# Patient Record
Sex: FEMALE | Race: WHITE | NOT HISPANIC OR LATINO | Employment: PART TIME | URBAN - METROPOLITAN AREA
[De-identification: names, ages, dates, MRNs, and addresses within clinical notes are randomized per-mention and may not be internally consistent; named-entity substitution may affect disease eponyms.]

---

## 2023-08-10 LAB
EXTERNAL HEPATITIS B SURFACE ANTIGEN: NEGATIVE
EXTERNAL HEPATITIS C AB: NORMAL
EXTERNAL RUBELLA QUALITATIVE: NORMAL
EXTERNAL SYPHILIS RPR SCREEN: NORMAL
HIV1 P24 AG SERPL QL IA: NORMAL
VZV IGG SER QL: <135 (ref 0–?)

## 2024-01-09 LAB
EXTERNAL GLUCOSE TOLERANCE TEST FASTING: 81 MG/DL
EXTERNAL GTT 1 HOUR: 194
EXTERNAL GTT 3 HOURS: 128

## 2024-02-06 ENCOUNTER — HOSPITAL ENCOUNTER (INPATIENT)
Facility: HOSPITAL | Age: 24
LOS: 6 days | Discharge: HOME | End: 2024-02-12
Attending: OBSTETRICS & GYNECOLOGY | Admitting: OBSTETRICS & GYNECOLOGY
Payer: MEDICAID

## 2024-02-06 LAB
ABO GROUP BLD: NORMAL
ABO GROUP BLD: NORMAL
ALP SERPL-CCNC: 164 U/L (ref 39–117)
ALT SERPL W P-5'-P-CCNC: 10 U/L (ref 1–33)
AMPHET+METHAMPHET UR QL: NEGATIVE
AMPHETAMINES UR QL: NEGATIVE
AST SERPL-CCNC: 18 U/L (ref 1–32)
BACTERIA UR QL AUTO: NORMAL /HPF
BARBITURATES UR QL SCN: NEGATIVE
BENZODIAZ UR QL SCN: NEGATIVE
BILIRUB SERPL-MCNC: 0.2 MG/DL (ref 0–1.2)
BILIRUB UR QL STRIP: NEGATIVE
BLD GP AB SCN SERPL QL: NEGATIVE
BUPRENORPHINE SERPL-MCNC: NEGATIVE NG/ML
CANNABINOIDS SERPL QL: NEGATIVE
CLARITY UR: CLEAR
COCAINE UR QL: NEGATIVE
COLOR UR: YELLOW
CREAT SERPL-MCNC: 0.44 MG/DL (ref 0.57–1)
DEPRECATED RDW RBC AUTO: 39.3 FL (ref 37–54)
ERYTHROCYTE [DISTWIDTH] IN BLOOD BY AUTOMATED COUNT: 13.2 % (ref 12.3–15.4)
FENTANYL UR-MCNC: NEGATIVE NG/ML
GLUCOSE UR STRIP-MCNC: NEGATIVE MG/DL
HCT VFR BLD AUTO: 38 % (ref 34–46.6)
HGB BLD-MCNC: 12.7 G/DL (ref 12–15.9)
HGB UR QL STRIP.AUTO: ABNORMAL
HYALINE CASTS UR QL AUTO: NORMAL /LPF
KETONES UR QL STRIP: ABNORMAL
LDH SERPL-CCNC: 129 U/L (ref 135–214)
LEUKOCYTE ESTERASE UR QL STRIP.AUTO: NEGATIVE
MCH RBC QN AUTO: 27.5 PG (ref 26.6–33)
MCHC RBC AUTO-ENTMCNC: 33.4 G/DL (ref 31.5–35.7)
MCV RBC AUTO: 82.4 FL (ref 79–97)
METHADONE UR QL SCN: NEGATIVE
NITRITE UR QL STRIP: NEGATIVE
OPIATES UR QL: NEGATIVE
OXYCODONE UR QL SCN: NEGATIVE
PCP UR QL SCN: NEGATIVE
PH UR STRIP.AUTO: 6 [PH] (ref 5–8)
PLATELET # BLD AUTO: 212 10*3/MM3 (ref 140–450)
PMV BLD AUTO: 11 FL (ref 6–12)
PROT UR QL STRIP: NEGATIVE
RBC # BLD AUTO: 4.61 10*6/MM3 (ref 3.77–5.28)
RBC # UR STRIP: NORMAL /HPF
REF LAB TEST METHOD: NORMAL
RH BLD: POSITIVE
RH BLD: POSITIVE
SP GR UR STRIP: 1.01 (ref 1–1.03)
SQUAMOUS #/AREA URNS HPF: NORMAL /HPF
T&S EXPIRATION DATE: NORMAL
TRICYCLICS UR QL SCN: NEGATIVE
URATE SERPL-MCNC: 4.6 MG/DL (ref 2.4–5.7)
UROBILINOGEN UR QL STRIP: ABNORMAL
WBC # UR STRIP: NORMAL /HPF
WBC NRBC COR # BLD AUTO: 16.5 10*3/MM3 (ref 3.4–10.8)

## 2024-02-06 PROCEDURE — 84460 ALANINE AMINO (ALT) (SGPT): CPT | Performed by: OBSTETRICS & GYNECOLOGY

## 2024-02-06 PROCEDURE — 86780 TREPONEMA PALLIDUM: CPT | Performed by: OBSTETRICS & GYNECOLOGY

## 2024-02-06 PROCEDURE — 86900 BLOOD TYPING SEROLOGIC ABO: CPT

## 2024-02-06 PROCEDURE — 80307 DRUG TEST PRSMV CHEM ANLYZR: CPT | Performed by: OBSTETRICS & GYNECOLOGY

## 2024-02-06 PROCEDURE — 86850 RBC ANTIBODY SCREEN: CPT | Performed by: OBSTETRICS & GYNECOLOGY

## 2024-02-06 PROCEDURE — 83615 LACTATE (LD) (LDH) ENZYME: CPT | Performed by: OBSTETRICS & GYNECOLOGY

## 2024-02-06 PROCEDURE — 59025 FETAL NON-STRESS TEST: CPT | Performed by: OBSTETRICS & GYNECOLOGY

## 2024-02-06 PROCEDURE — 86900 BLOOD TYPING SEROLOGIC ABO: CPT | Performed by: OBSTETRICS & GYNECOLOGY

## 2024-02-06 PROCEDURE — 59025 FETAL NON-STRESS TEST: CPT

## 2024-02-06 PROCEDURE — 86901 BLOOD TYPING SEROLOGIC RH(D): CPT | Performed by: OBSTETRICS & GYNECOLOGY

## 2024-02-06 PROCEDURE — 82565 ASSAY OF CREATININE: CPT | Performed by: OBSTETRICS & GYNECOLOGY

## 2024-02-06 PROCEDURE — 99222 1ST HOSP IP/OBS MODERATE 55: CPT | Performed by: OBSTETRICS & GYNECOLOGY

## 2024-02-06 PROCEDURE — 82247 BILIRUBIN TOTAL: CPT | Performed by: OBSTETRICS & GYNECOLOGY

## 2024-02-06 PROCEDURE — 84450 TRANSFERASE (AST) (SGOT): CPT | Performed by: OBSTETRICS & GYNECOLOGY

## 2024-02-06 PROCEDURE — 84550 ASSAY OF BLOOD/URIC ACID: CPT | Performed by: OBSTETRICS & GYNECOLOGY

## 2024-02-06 PROCEDURE — 81001 URINALYSIS AUTO W/SCOPE: CPT | Performed by: OBSTETRICS & GYNECOLOGY

## 2024-02-06 PROCEDURE — 85027 COMPLETE CBC AUTOMATED: CPT | Performed by: OBSTETRICS & GYNECOLOGY

## 2024-02-06 PROCEDURE — 25010000002 AMPICILLIN PER 500 MG: Performed by: OBSTETRICS & GYNECOLOGY

## 2024-02-06 PROCEDURE — 84075 ASSAY ALKALINE PHOSPHATASE: CPT | Performed by: OBSTETRICS & GYNECOLOGY

## 2024-02-06 PROCEDURE — 25010000002 MAGNESIUM SULFATE 20 GM/500ML SOLUTION: Performed by: OBSTETRICS & GYNECOLOGY

## 2024-02-06 PROCEDURE — 86901 BLOOD TYPING SEROLOGIC RH(D): CPT

## 2024-02-06 PROCEDURE — 87081 CULTURE SCREEN ONLY: CPT | Performed by: OBSTETRICS & GYNECOLOGY

## 2024-02-06 PROCEDURE — 25810000003 LACTATED RINGERS PER 1000 ML: Performed by: OBSTETRICS & GYNECOLOGY

## 2024-02-06 RX ORDER — ONDANSETRON 2 MG/ML
4 INJECTION INTRAMUSCULAR; INTRAVENOUS EVERY 8 HOURS PRN
Status: DISCONTINUED | OUTPATIENT
Start: 2024-02-06 | End: 2024-02-09

## 2024-02-06 RX ORDER — ONDANSETRON 4 MG/1
8 TABLET, ORALLY DISINTEGRATING ORAL EVERY 8 HOURS PRN
Status: DISCONTINUED | OUTPATIENT
Start: 2024-02-06 | End: 2024-02-09

## 2024-02-06 RX ORDER — DOCUSATE SODIUM 100 MG/1
100 CAPSULE, LIQUID FILLED ORAL 2 TIMES DAILY PRN
Status: DISCONTINUED | OUTPATIENT
Start: 2024-02-06 | End: 2024-02-09

## 2024-02-06 RX ORDER — SODIUM CHLORIDE, SODIUM LACTATE, POTASSIUM CHLORIDE, CALCIUM CHLORIDE 600; 310; 30; 20 MG/100ML; MG/100ML; MG/100ML; MG/100ML
75 INJECTION, SOLUTION INTRAVENOUS CONTINUOUS
Status: DISCONTINUED | OUTPATIENT
Start: 2024-02-06 | End: 2024-02-09

## 2024-02-06 RX ORDER — ACETAMINOPHEN 325 MG/1
650 TABLET ORAL EVERY 4 HOURS PRN
Status: DISCONTINUED | OUTPATIENT
Start: 2024-02-06 | End: 2024-02-09

## 2024-02-06 RX ORDER — BETAMETHASONE SODIUM PHOSPHATE AND BETAMETHASONE ACETATE 3; 3 MG/ML; MG/ML
12 INJECTION, SUSPENSION INTRA-ARTICULAR; INTRALESIONAL; INTRAMUSCULAR; SOFT TISSUE ONCE
Status: COMPLETED | OUTPATIENT
Start: 2024-02-07 | End: 2024-02-07

## 2024-02-06 RX ORDER — MAGNESIUM SULFATE HEPTAHYDRATE 40 MG/ML
2 INJECTION, SOLUTION INTRAVENOUS CONTINUOUS
Status: DISCONTINUED | OUTPATIENT
Start: 2024-02-06 | End: 2024-02-09

## 2024-02-06 RX ORDER — BISACODYL 10 MG
10 SUPPOSITORY, RECTAL RECTAL DAILY PRN
Status: DISCONTINUED | OUTPATIENT
Start: 2024-02-06 | End: 2024-02-09

## 2024-02-06 RX ORDER — SODIUM CHLORIDE 9 MG/ML
40 INJECTION, SOLUTION INTRAVENOUS AS NEEDED
Status: DISCONTINUED | OUTPATIENT
Start: 2024-02-06 | End: 2024-02-09

## 2024-02-06 RX ORDER — PRENATAL WITH FERROUS FUM AND FOLIC ACID 3080; 920; 120; 400; 22; 1.84; 3; 20; 10; 1; 12; 200; 27; 25; 2 [IU]/1; [IU]/1; MG/1; [IU]/1; MG/1; MG/1; MG/1; MG/1; MG/1; MG/1; UG/1; MG/1; MG/1; MG/1; MG/1
TABLET ORAL DAILY
COMMUNITY

## 2024-02-06 RX ORDER — SODIUM CHLORIDE 0.9 % (FLUSH) 0.9 %
10 SYRINGE (ML) INJECTION EVERY 12 HOURS SCHEDULED
Status: DISCONTINUED | OUTPATIENT
Start: 2024-02-06 | End: 2024-02-09

## 2024-02-06 RX ORDER — LIDOCAINE HYDROCHLORIDE 10 MG/ML
0.5 INJECTION, SOLUTION EPIDURAL; INFILTRATION; INTRACAUDAL; PERINEURAL ONCE AS NEEDED
Status: DISCONTINUED | OUTPATIENT
Start: 2024-02-06 | End: 2024-02-09

## 2024-02-06 RX ORDER — SODIUM CHLORIDE 0.9 % (FLUSH) 0.9 %
10 SYRINGE (ML) INJECTION AS NEEDED
Status: DISCONTINUED | OUTPATIENT
Start: 2024-02-06 | End: 2024-02-09

## 2024-02-06 RX ADMIN — AMPICILLIN SODIUM 1 G: 1 INJECTION, POWDER, FOR SOLUTION INTRAMUSCULAR; INTRAVENOUS at 23:00

## 2024-02-06 RX ADMIN — MAGNESIUM SULFATE HEPTAHYDRATE 2 G/HR: 40 INJECTION, SOLUTION INTRAVENOUS at 17:30

## 2024-02-06 RX ADMIN — ACETAMINOPHEN 650 MG: 325 TABLET ORAL at 19:13

## 2024-02-06 RX ADMIN — SODIUM CHLORIDE, POTASSIUM CHLORIDE, SODIUM LACTATE AND CALCIUM CHLORIDE 75 ML/HR: 600; 310; 30; 20 INJECTION, SOLUTION INTRAVENOUS at 17:30

## 2024-02-06 RX ADMIN — MAGNESIUM SULFATE HEPTAHYDRATE 2 G/HR: 40 INJECTION, SOLUTION INTRAVENOUS at 21:06

## 2024-02-06 RX ADMIN — AMPICILLIN SODIUM 2000 MG: 2 INJECTION, POWDER, FOR SOLUTION INTRAMUSCULAR; INTRAVENOUS at 19:05

## 2024-02-06 RX ADMIN — Medication 10 ML: at 21:00

## 2024-02-06 NOTE — H&P
"[facility]  Obstetric History and Physical    Referring Provider: Verónica Kim MD      Chief Complaint   Patient presents with    Laboring       Subjective     Patient is a 23 y.o. female  currently at 32w2d, who presents as a transfer from [facility] for  labor.  Initial prenatal care by Dr. Kim in [facility].  Patient presented to labor and delivery in [place] today after experiencing some abdominal pain this morning vaginal spotting.  After arrival to labor and delivery patient noted to have regular uterine activity.  She was initially closed and despite IV hydration terbutaline progressed to 1 cm.  Due to her early gestation she was started on magnesium sulfate given her first dose of betamethasone and transferred to [facility] due to NICU and [facility] care.   course complicated by gestational diabetes mellitus A1 well-controlled.  Patient reports fasting blood sugar this morning 85.  Patient denies urinary symptoms, leaking of fluid, recent trauma, reports normal fetal activity.        The following portions of the patients history were reviewed and updated as appropriate: current medications, allergies, past medical history, past surgical history, past family history, past social history, and problem list .       Prenatal Information:   Maternal Prenatal Labs  Blood Type No results found for: \"ABO\"   Rh Status No results found for: \"RH\"   Antibody Screen No results found for: \"ABSCRN\"   Gonnorhea No results found for: \"GCCX\"   Chlamydia No results found for: \"CLAMYDCU\"   RPR No results found for: \"RPR\"   Syphilis Antibody No results found for: \"SYPHILIS\"   Rubella No results found for: \"RUBELLAIGGIN\"   Hepatitis B Surface Antigen No results found for: \"HEPBSAG\"   HIV-1 Antibody No results found for: \"LABHIV1\"   Hepatitis C Antibody No results found for: \"HEPCAB\"   Rapid Urin Drug Screen No results found for: \"AMPMETHU\", \"BARBITSCNUR\", \"LABBENZSCN\", " "\"LABMETHSCN\", \"LABOPIASCN\", \"THCURSCR\", \"COCAINEUR\", \"AMPHETSCREEN\", \"PROPOXSCN\", \"BUPRENORSCNU\", \"METAMPSCNUR\", \"OXYCODONESCN\", \"TRICYCLICSCN\"   Group B Strep Culture No results found for: \"GBSANTIGEN\"                 Past OB History:       OB History    Para Term  AB Living   1 0 0 0 0 0   SAB IAB Ectopic Molar Multiple Live Births   0 0 0 0 0 0      # Outcome Date GA Lbr Luis/2nd Weight Sex Delivery Anes PTL Lv   1 Current                Past Medical History: Past Medical History:   Diagnosis Date    Gestational diabetes       Past Surgical History Past Surgical History:   Procedure Laterality Date    TONSILLECTOMY        Family History: History reviewed. No pertinent family history.   Social History:  reports that she has never smoked. She has never used smokeless tobacco.   reports no history of alcohol use.   reports no history of drug use.                   General ROS Negative Findings:Headaches, Visual Changes, Epigastric pain, Anorexia, Nausia/Vomiting, ROM, and Vaginal Bleeding    ROS     All other systems have been reviewed and are neg  Objective       Vital Signs Range for the last 24 hours  Temperature: Temp:  [98.5 °F (36.9 °C)] 98.5 °F (36.9 °C)   Temp Source: Temp src: Oral   BP: BP: (130)/(78) 130/78   Pulse: Heart Rate:  [106] 106   Respirations: Resp:  [16] 16   SPO2:     O2 Amount (l/min):     O2 Devices     Weight:       Physical Examination:   General:   alert, appears stated age, and cooperative   Skin:   normal   HEENT:     Lungs:   clear to auscultation bilaterally   Heart:   regular rate and rhythm, S1, S2 normal, no murmur, click, rub or gallop   Gastrointestinal: Abdomen soft, gravid uterus nontender, guarding benign exam   Lower Extremities: No edema no calf tenderness   : Normal extended to a, vaginal vault clear,   Musculoskeletal:     Neuro: No focal deficits noted.         Presentation: Vallate confirmed by ultrasound   Cervix: Exam by: Method: sterile exam per " physician   Dilation:  1 cm   Effacement: Cervical Effacement: 60%   Station:  -2 posterior  Small amount of blood noted on glove.       Fetal Heart Rate Assessment   Method:     Beats/min:     Baseline:     Varibility:     Accels:     Decels:     Tracing Category:     NST-indications  labor, interpretation reactive, moderate variability, accelerations present 10 x 15, no fetal deceleration noted, onset 1004, end time 1835, tracks every 4 to 8 minutes noted.  Uterine Assessment   Method:     Frequency (min):     Ctx Count in 10 min:     Duration:     Intensity:     Intensity by IUPC:     Resting Tone:     Resting Tone by IUPC:     [place] Units:       Laboratory Results:   Lab Results (last 24 hours)       ** No results found for the last 24 hours. **          Radiology Review:   Imaging Results (Last 24 Hours)       ** No results found for the last 24 hours. **          Other Studies:    Assessment & Plan       * No active hospital problems. *        Assessment:  1.  Intrauterine pregnancy at 32w2d weeks gestation with reactive fetal status.    2.   labor  3.  Gestational diabetes mellitus A1 well-controlled  4.  First dose of betamethasone given at 1615 today  5.  Vertex presentation  Plan:  1.  Admit to antepartum unit, continue magnesium sulfate, complete steroids for fetal benefit if possible, NICU consult, PDC ultrasound and consult in AM.  2. Plan of care has been reviewed with patient.  3.  Risks, benefits of treatment plan have been discussed.  4.  All questions have been answered.  5      Titi Canales DO  2024  18:30 EST

## 2024-02-07 ENCOUNTER — APPOINTMENT (OUTPATIENT)
Dept: WOMENS IMAGING | Facility: HOSPITAL | Age: 24
End: 2024-02-07
Payer: MEDICAID

## 2024-02-07 LAB
GLUCOSE BLDC GLUCOMTR-MCNC: 112 MG/DL (ref 70–130)
GLUCOSE BLDC GLUCOMTR-MCNC: 114 MG/DL (ref 70–130)
GLUCOSE BLDC GLUCOMTR-MCNC: 117 MG/DL (ref 70–130)
GLUCOSE BLDC GLUCOMTR-MCNC: 126 MG/DL (ref 70–130)
GLUCOSE BLDC GLUCOMTR-MCNC: 96 MG/DL (ref 70–130)
T PALLIDUM IGG SER QL: NORMAL

## 2024-02-07 PROCEDURE — 76819 FETAL BIOPHYS PROFIL W/O NST: CPT

## 2024-02-07 PROCEDURE — 76820 UMBILICAL ARTERY ECHO: CPT | Performed by: OBSTETRICS & GYNECOLOGY

## 2024-02-07 PROCEDURE — 99254 IP/OBS CNSLTJ NEW/EST MOD 60: CPT | Performed by: OBSTETRICS & GYNECOLOGY

## 2024-02-07 PROCEDURE — 82948 REAGENT STRIP/BLOOD GLUCOSE: CPT

## 2024-02-07 PROCEDURE — 76811 OB US DETAILED SNGL FETUS: CPT

## 2024-02-07 PROCEDURE — 76820 UMBILICAL ARTERY ECHO: CPT

## 2024-02-07 PROCEDURE — 25010000002 BETAMETHASONE ACET & SOD PHOS PER 4 MG: Performed by: OBSTETRICS & GYNECOLOGY

## 2024-02-07 PROCEDURE — 25010000002 AMPICILLIN PER 500 MG: Performed by: OBSTETRICS & GYNECOLOGY

## 2024-02-07 PROCEDURE — 25810000003 LACTATED RINGERS PER 1000 ML: Performed by: OBSTETRICS & GYNECOLOGY

## 2024-02-07 PROCEDURE — 76819 FETAL BIOPHYS PROFIL W/O NST: CPT | Performed by: OBSTETRICS & GYNECOLOGY

## 2024-02-07 PROCEDURE — 76811 OB US DETAILED SNGL FETUS: CPT | Performed by: OBSTETRICS & GYNECOLOGY

## 2024-02-07 PROCEDURE — 59025 FETAL NON-STRESS TEST: CPT

## 2024-02-07 PROCEDURE — 25010000002 MAGNESIUM SULFATE 20 GM/500ML SOLUTION: Performed by: OBSTETRICS & GYNECOLOGY

## 2024-02-07 RX ADMIN — AMPICILLIN SODIUM 1 G: 1 INJECTION, POWDER, FOR SOLUTION INTRAMUSCULAR; INTRAVENOUS at 15:26

## 2024-02-07 RX ADMIN — SODIUM CHLORIDE, POTASSIUM CHLORIDE, SODIUM LACTATE AND CALCIUM CHLORIDE 75 ML/HR: 600; 310; 30; 20 INJECTION, SOLUTION INTRAVENOUS at 13:38

## 2024-02-07 RX ADMIN — AMPICILLIN SODIUM 1 G: 1 INJECTION, POWDER, FOR SOLUTION INTRAMUSCULAR; INTRAVENOUS at 10:31

## 2024-02-07 RX ADMIN — ACETAMINOPHEN 650 MG: 325 TABLET ORAL at 11:32

## 2024-02-07 RX ADMIN — AMPICILLIN SODIUM 1 G: 1 INJECTION, POWDER, FOR SOLUTION INTRAMUSCULAR; INTRAVENOUS at 23:30

## 2024-02-07 RX ADMIN — AMPICILLIN SODIUM 1 G: 1 INJECTION, POWDER, FOR SOLUTION INTRAMUSCULAR; INTRAVENOUS at 19:46

## 2024-02-07 RX ADMIN — BETAMETHASONE ACETATE AND BETAMETHASONE SODIUM PHOSPHATE 12 MG: 3; 3 INJECTION, SUSPENSION INTRA-ARTICULAR; INTRALESIONAL; INTRAMUSCULAR; SOFT TISSUE at 16:28

## 2024-02-07 RX ADMIN — AMPICILLIN SODIUM 1 G: 1 INJECTION, POWDER, FOR SOLUTION INTRAMUSCULAR; INTRAVENOUS at 03:18

## 2024-02-07 RX ADMIN — AMPICILLIN SODIUM 1 G: 1 INJECTION, POWDER, FOR SOLUTION INTRAMUSCULAR; INTRAVENOUS at 07:18

## 2024-02-07 RX ADMIN — MAGNESIUM SULFATE HEPTAHYDRATE 2 G/HR: 40 INJECTION, SOLUTION INTRAVENOUS at 10:31

## 2024-02-07 NOTE — CONSULTS
"[facility] consult  Referring Provider: Verónica Kim; [place], [place]    Chief Complaint   Patient presents with    Laboring       Subjective     Patient is a 23 y.o. female  currently at 32w3d, who presents with  labor and vaginal spotting.  Patient began ramila and spotting yesterday morning.  She presented to the hospital and [place].  Initially her cervix was closed.  She progressed to 1 cm dilated despite hydration and terbutaline toco lysis.  She was placed on magnesium sulfate for toco lysis and transferred to [facility] due to her gestational age..  She continued to contract here and progressed to 2 cm dilated before the magnesium significantly decreased or contractions.  Patient's pregnancy has been complicated by gestational diabetes that is diet-controlled.  NKDA  Patient's noticed no fever or chills, and no rupture membranes.  Family history is noncontributory.    Her prenatal care is complicated by  diabetes  GDM A1.  Her previous obstetric/gynecological history is noted for is non-contributory.        Prenatal Information:   Maternal Prenatal Labs  Blood Type ABO Type   Date Value Ref Range Status   2024 O  Final      Rh Status RH type   Date Value Ref Range Status   2024 Positive  Final      Antibody Screen Antibody Screen   Date Value Ref Range Status   2024 Negative  Final      Gonnorhea No results found for: \"GCCX\"   Chlamydia No results found for: \"CLAMYDCU\"   RPR No results found for: \"RPR\"       Rubella No results found for: \"RUBELLAIGGIN\"   Hepatitis B Surface Antigen No results found for: \"HEPBSAG\"   HIV-1 Antibody No results found for: \"LABHIV1\"   Hepatitis C Antibody No results found for: \"HEPCAB\"   Rapid Urin Drug Screen Barbiturates Screen, Urine   Date Value Ref Range Status   2024 Negative Negative Final     Benzodiazepine Screen, Urine   Date Value Ref Range Status   2024 Negative Negative Final     Methadone Screen, Urine " "  Date Value Ref Range Status   02/06/2024 Negative Negative Final     Opiate Screen   Date Value Ref Range Status   02/06/2024 Negative Negative Final     THC, Screen, Urine   Date Value Ref Range Status   02/06/2024 Negative Negative Final     Cocaine Screen, Urine   Date Value Ref Range Status   02/06/2024 Negative Negative Final     Amphetamine Screen, Urine   Date Value Ref Range Status   02/06/2024 Negative Negative Final     Buprenorphine, Screen, Urine   Date Value Ref Range Status   02/06/2024 Negative Negative Final     Methamphetamine, Ur   Date Value Ref Range Status   02/06/2024 Negative Negative Final     Oxycodone Screen, Urine   Date Value Ref Range Status   02/06/2024 Negative Negative Final     Tricyclic Antidepressants Screen   Date Value Ref Range Status   02/06/2024 Negative Negative Final      Group B Strep Culture No results found for: \"GBSANTIGEN\"           External Prenatal Results       Pregnancy Outside Results - Transcribed From Office Records - See Scanned Records For Details       Test Value Date Time    ABO  O  02/06/24 2141    Rh  Positive  02/06/24 2141    Antibody Screen  Negative  02/06/24 1856    Varicella IgG ^ <135  08/10/23     Rubella ^ Immune  08/10/23     Hgb  12.7 g/dL 02/06/24 1856    Hct  38.0 % 02/06/24 1856    Glucose Fasting GTT ^ 81 mg/dL 01/09/24     Glucose Tolerance Test 1 hour ^ 194  01/09/24     Glucose Tolerance Test 3 hour ^ 128  01/09/24     Gonorrhea (discrete)       Chlamydia (discrete)       RPR ^ Non-Reactive  08/10/23     VDRL       Syphilis Antibody       HBsAg ^ Negative  08/10/23     Herpes Simplex Virus PCR       Herpes Simplex VIrus Culture       HIV ^ Non-Reactive  08/10/23     Hep C RNA Quant PCR       Hep C Antibody ^ Non-Reactive  08/10/23     AFP       Group B Strep       GBS Susceptibility to Clindamycin       GBS Susceptibility to Erythromycin       Fetal Fibronectin       Genetic Testing, Maternal Blood                 Drug Screening       " Test Value Date Time    Urine Drug Screen       Amphetamine Screen  Negative  24    Barbiturate Screen  Negative  24    Benzodiazepine Screen  Negative  24    Methadone Screen  Negative  24    Phencyclidine Screen  Negative  24    Opiates Screen  Negative  24    THC Screen  Negative  24    Cocaine Screen       Propoxyphene Screen       Buprenorphine Screen  Negative  24    Methamphetamine Screen       Oxycodone Screen  Negative  24    Tricyclic Antidepressants Screen  Negative  24              Legend    ^: Historical                              Past OB History:       OB History    Para Term  AB Living   1 0 0 0 0 0   SAB IAB Ectopic Molar Multiple Live Births   0 0 0 0 0 0      # Outcome Date GA Lbr Luis/2nd Weight Sex Delivery Anes PTL Lv   1 Current                Past Medical History: Past Medical History:   Diagnosis Date    Gestational diabetes       Past Surgical History Past Surgical History:   Procedure Laterality Date    TONSILLECTOMY        Family History: History reviewed. No pertinent family history.   Social History:  reports that she has never smoked. She has never used smokeless tobacco.   reports no history of alcohol use.   reports no history of drug use.        General ROS: Pertinent items are noted in HPI, all other systems reviewed and negative    Objective       Vital Signs Range for the last 24 hours  Temperature: Temp:  [97.9 °F (36.6 °C)-98.5 °F (36.9 °C)] 98 °F (36.7 °C)   Temp Source: Temp src: Oral   BP: BP: (102-130)/(59-78) 102/65   Pulse: Heart Rate:  [] 92   Respirations: Resp:  [16-18] 16               Weight: Weight:  [59 kg (130 lb)] 59 kg (130 lb)     Physical Examination: General appearance - alert, well appearing, and in no distress  Mental status - alert, oriented to person, place, and time  Eyes - sclera anicteric, left eye normal, right eye  normal  Ears - bilateral TM's and external ear canals normal  Mouth - mucous membranes moist, pharynx normal without lesions  Neck - supple, no significant adenopathy  Chest - no tachypnea, retractions or cyanosis  Heart - normal rate and regular rhythm  Abdomen - gravid, nontender  Back exam - full range of motion, no tenderness, palpable spasm or pain on motion  Neurological - alert, oriented, normal speech, no focal findings or movement disorder noted  Musculoskeletal - no joint tenderness, deformity or swelling  Extremities - peripheral pulses normal, no pedal edema, no clubbing or cyanosis  Skin - normal coloration and turgor, no rashes, no suspicious skin lesions noted    Presentation: vertex   Cervix: Exam by: Method: sterile exam per physician   Dilation:     Effacement: Cervical Effacement: 90%   Station:         Fetal Heart Rate Assessment   Method: Fetal HR Assessment Method: external   Beats/min: Fetal HR (beats/min): 130   Baseline: Fetal HR Baseline: normal range   Varibility: Fetal HR Variability: moderate (amplitude range 6 to 25 bpm)   Accels: Fetal HR Accelerations: greater than/equal to 15 bpm, lasting at least 15 seconds   Decels: Fetal HR Decelerations: absent   Tracing Category:       Uterine Assessment   Method: Method: external tocotransducer   Frequency (min): Contraction Frequency (Minutes): x1   Ctx Count in 10 min:     Duration:     Intensity: Contraction Intensity: no contractions   Intensity by IUPC:     Resting Tone: Uterine Resting Tone: soft by palpation   Resting Tone by IUPC:     [place] Units:       Laboratory Results: nl PLTS, LFTs  Radiology Review: S=D  see Viewpoint by me today  Other Studies: reactive tracing, occational ctx    Assessment & Plan       * No active hospital problems. *        Assessment:  1.  Intrauterine pregnancy at 32w3d weeks gestation with reactive fetal status.    2.   labor  without ROM  Gestational Diabetes  3.  Obstetrical history significant  "for is non-contributory.  4.  GBS status: No results found for: \"GBSANTIGEN\"    Plan:  1. fetal and uterine monitoring  continuously, tocolysis with magnesium sulfate, IV antibiotics, and IV steroids for fetal benefit  2. Plan of care has been reviewed with patient.  3.  Risks, benefits of treatment plan have been discussed.  4.  All questions have been answered.      Douglas A. Milligan, MD  2/7/2024  07:18 EST  "

## 2024-02-07 NOTE — NURSING NOTE
PRENATAL CONTACT - NDRT    Requested by OB to meet with parents to update them with delivery and admission procedures for their infant.    Present: mother and father    Pertinent Prenatal Information:    Gestational Age: 32   3/7 weeks  Other: HORACE      The following issues were discussed:    1) Admission Procedure.  2) NICU Visitation Policy.  3) Skin to Skin Care ([facility]).  4) Hand Expression for Breast Milk soon after birth.  5) Breast Feeding/Expressing Breast Milk.  6) Other Issues Discussed: discharge, delivery, admission      Concerns Voiced by Parents: mother stated concerns with gestational diabetes        Donna Platt RN    2/7/2024   02:31 EST

## 2024-02-07 NOTE — PROGRESS NOTES
Labourist:      Called to see alex due to her complaining that she is hurting much more with contractions.    She had been 1/60 on admission and now she is 2/90 with bulging membranes.    Given that she has changed her cervix will move her to Labour [place], but continue the Magnesium and Amp.  Will do expectant management at this time.

## 2024-02-07 NOTE — PAYOR COMM NOTE
"Chintan Douglas Shields (23 y.o. Female)     [employer] Medicaid Auth#[id number]      [place]    From:Ronna Buckley LPN, Utilization Review  Phone #[phone]  Fax #[phone]        Date of Birth   2000    Social Security Number       Address   [address] [address]    Home Phone   [phone]    MRN   [id number]       Sikhism   None    Marital Status                               Admission Date   24    Admission Type   Elective    Admitting Provider   Titi Canales DO    Attending Provider   Titi Canales DO    Department, Room/Bed   [facility] ANTEPARTUM, N324/       Discharge Date       Discharge Disposition       Discharge Destination                                 Attending Provider: Titi Canales DO    Allergies: No Known Allergies    Isolation: None   Infection: None   Code Status: CPR    Ht: 165.1 cm (65\")   Wt: 59 kg (130 lb)    Admission Cmt: None   Principal Problem: None                  Active Insurance as of 2024       Primary Coverage       Payor Plan Insurance Group Employer/Plan Group    [place] [id number]       Payor Plan Address Payor Plan Phone Number Payor Plan Fax Number Effective Dates    [employer] [address] [address] [phone]  2021 - None Entered    [facility] [address]         Subscriber Name Subscriber Birth Date Member ID       DOUGLAS HALE 2000 [id number]                     Emergency Contacts        (Rel.) Home Phone Work Phone Mobile Phone    padmaja Hale (Spouse) -- -- [phone]              Insurance Information                  [place] Phone: [phone]    Subscriber: Douglas Hale Subscriber#: [id number]    Group#: [id number] Precert#: --             History & Physical        Titi Canales DO at 24 1830          [facility]  Obstetric History and Physical    Referring Provider: Verónica ADAN" "MD Julio      Chief Complaint   Patient presents with    Laboring       Subjective    Patient is a 23 y.o. female  currently at 32w2d, who presents as a transfer from [facility] for  labor.  Initial prenatal care by Dr. Kim in [facility].  Patient presented to labor and delivery in [place] today after experiencing some abdominal pain this morning vaginal spotting.  After arrival to labor and delivery patient noted to have regular uterine activity.  She was initially closed and despite IV hydration terbutaline progressed to 1 cm.  Due to her early gestation she was started on magnesium sulfate given her first dose of betamethasone and transferred to [facility] due to NICU and [facility] care.   course complicated by gestational diabetes mellitus A1 well-controlled.  Patient reports fasting blood sugar this morning 85.  Patient denies urinary symptoms, leaking of fluid, recent trauma, reports normal fetal activity.        The following portions of the patients history were reviewed and updated as appropriate: current medications, allergies, past medical history, past surgical history, past family history, past social history, and problem list .       Prenatal Information:   Maternal Prenatal Labs  Blood Type No results found for: \"ABO\"   Rh Status No results found for: \"RH\"   Antibody Screen No results found for: \"ABSCRN\"   Gonnorhea No results found for: \"GCCX\"   Chlamydia No results found for: \"CLAMYDCU\"   RPR No results found for: \"RPR\"   Syphilis Antibody No results found for: \"SYPHILIS\"   Rubella No results found for: \"RUBELLAIGGIN\"   Hepatitis B Surface Antigen No results found for: \"HEPBSAG\"   HIV-1 Antibody No results found for: \"LABHIV1\"   Hepatitis C Antibody No results found for: \"HEPCAB\"   Rapid Urin Drug Screen No results found for: \"AMPMETHU\", \"BARBITSCNUR\", \"LABBENZSCN\", \"LABMETHSCN\", \"LABOPIASCN\", \"THCURSCR\", \"COCAINEUR\", \"AMPHETSCREEN\", \"PROPOXSCN\", " "\"BUPRENORSCNU\", \"METAMPSCNUR\", \"OXYCODONESCN\", \"TRICYCLICSCN\"   Group B Strep Culture No results found for: \"GBSANTIGEN\"                 Past OB History:       OB History    Para Term  AB Living   1 0 0 0 0 0   SAB IAB Ectopic Molar Multiple Live Births   0 0 0 0 0 0      # Outcome Date GA Lbr Luis/2nd Weight Sex Delivery Anes PTL Lv   1 Current                Past Medical History: Past Medical History:   Diagnosis Date    Gestational diabetes       Past Surgical History Past Surgical History:   Procedure Laterality Date    TONSILLECTOMY        Family History: History reviewed. No pertinent family history.   Social History:  reports that she has never smoked. She has never used smokeless tobacco.   reports no history of alcohol use.   reports no history of drug use.                   General ROS Negative Findings:Headaches, Visual Changes, Epigastric pain, Anorexia, Nausia/Vomiting, ROM, and Vaginal Bleeding    ROS     All other systems have been reviewed and are neg  Objective      Vital Signs Range for the last 24 hours  Temperature: Temp:  [98.5 °F (36.9 °C)] 98.5 °F (36.9 °C)   Temp Source: Temp src: Oral   BP: BP: (130)/(78) 130/78   Pulse: Heart Rate:  [106] 106   Respirations: Resp:  [16] 16   SPO2:     O2 Amount (l/min):     O2 Devices     Weight:       Physical Examination:   General:   alert, appears stated age, and cooperative   Skin:   normal   HEENT:     Lungs:   clear to auscultation bilaterally   Heart:   regular rate and rhythm, S1, S2 normal, no murmur, click, rub or gallop   Gastrointestinal: Abdomen soft, gravid uterus nontender, guarding benign exam   Lower Extremities: No edema no calf tenderness   : Normal extended to a, vaginal vault clear,   Musculoskeletal:     Neuro: No focal deficits noted.         Presentation: Vallate confirmed by ultrasound   Cervix: Exam by: Method: sterile exam per physician   Dilation:  1 cm   Effacement: Cervical Effacement: 60%   Station:  -2 " posterior  Small amount of blood noted on glove.       Fetal Heart Rate Assessment   Method:     Beats/min:     Baseline:     Varibility:     Accels:     Decels:     Tracing Category:     NST-indications  labor, interpretation reactive, moderate variability, accelerations present 10 x 15, no fetal deceleration noted, onset 1004, end time 1835, tracks every 4 to 8 minutes noted.  Uterine Assessment   Method:     Frequency (min):     Ctx Count in 10 min:     Duration:     Intensity:     Intensity by IUPC:     Resting Tone:     Resting Tone by IUPC:     Campo Units:       Laboratory Results:   Lab Results (last 24 hours)       ** No results found for the last 24 hours. **          Radiology Review:   Imaging Results (Last 24 Hours)       ** No results found for the last 24 hours. **          Other Studies:    Assessment & Plan      * No active hospital problems. *        Assessment:  1.  Intrauterine pregnancy at 32w2d weeks gestation with reactive fetal status.    2.   labor  3.  Gestational diabetes mellitus A1 well-controlled  4.  First dose of betamethasone given at 1615 today  5.  Vertex presentation  Plan:  1.  Admit to antepartum unit, continue magnesium sulfate, complete steroids for fetal benefit if possible, NICU consult, PDC ultrasound and consult in AM.  2. Plan of care has been reviewed with patient.  3.  Risks, benefits of treatment plan have been discussed.  4.  All questions have been answered.  5      Titi Canales DO  2024  18:30 EST    Electronically signed by Titi Canales DO at 24 1837       Facility-Administered Medications as of 2024   Medication Dose Route Frequency Provider Last Rate Last Admin    acetaminophen (TYLENOL) tablet 650 mg  650 mg Oral Q4H PRN Titi Canales DO   650 mg at 24 1132    [COMPLETED] ampicillin 2000 mg IVPB in 100 mL NS (MBP)  2,000 mg Intravenous Once Titi Canales  mL/hr at 24 1905 2,000 mg at  02/06/24 1905    Followed by    ampicillin 1000 mg IVPB in 100 mL NS (MBP)  1 g Intravenous Q4H Titi Canales,  mL/hr at 02/07/24 1031 1 g at 02/07/24 1031    betamethasone acetate-betamethasone sodium phosphate (CELESTONE SOLUSPAN) injection 12 mg  12 mg Intramuscular Once Titi Canales,         bisacodyl (DULCOLAX) suppository 10 mg  10 mg Rectal Daily PRN Titi Canales, DO        docusate sodium (COLACE) capsule 100 mg  100 mg Oral BID PRN Titi Canales,         lactated ringers bolus 1,000 mL  1,000 mL Intravenous PRN Titi Canales, DO        lactated ringers infusion  75 mL/hr Intravenous Continuous Titi Canales, DO 75 mL/hr at 02/06/24 1730 75 mL/hr at 02/06/24 1730    lidocaine PF 1% (XYLOCAINE) injection 0.5 mL  0.5 mL Intradermal Once PRN Titi Canales,         magnesium sulfate 20 GM/500ML infusion  2 g/hr Intravenous Continuous Titi Canales, DO 50 mL/hr at 02/07/24 1031 2 g/hr at 02/07/24 1031    ondansetron ODT (ZOFRAN-ODT) disintegrating tablet 8 mg  8 mg Oral Q8H PRN Titi Canales,         Or    ondansetron (ZOFRAN) injection 4 mg  4 mg Intravenous Q8H PRN Titi Canales, DO        sodium chloride 0.9 % flush 10 mL  10 mL Intravenous Q12H Titi Canales, DO   10 mL at 02/06/24 2100    sodium chloride 0.9 % flush 10 mL  10 mL Intravenous PRN Titi Canales, DO        sodium chloride 0.9 % infusion 40 mL  40 mL Intravenous PRN Titi Canales,          Lab Results (last 24 hours)       Procedure Component Value Units Date/Time    POC Glucose Once [[id number]]  (Normal) Collected: 02/07/24 1129    Specimen: Blood Updated: 02/07/24 1131     Glucose 114 mg/dL     POC Glucose Once [[id number]]  (Normal) Collected: 02/07/24 0721    Specimen: Blood Updated: 02/07/24 0723     Glucose 96 mg/dL     Rubella Antibody, IgG [[id number]] Resulted: 08/10/23    Specimen: Blood Updated: 02/07/24 0346     External Rubella Qual Immune    HIV-1  Antibody, EIA [[id number]] Resulted: 08/10/23    Specimen: Blood Updated: 02/07/24 0346     External HIV Antibody Non-Reactive    Varicella Zoster Antibody, IgG [[id number]] Resulted: 08/10/23    Specimen: Blood Updated: 02/07/24 0346     External Varicella Zoster IgG <135    Hepatitis C Antibody [[id number]] Resulted: 08/10/23    Specimen: Blood Updated: 02/07/24 0346     External Hepatitis C Ab Non-Reactive    GTT 3 Hour [[id number]] Resulted: 01/09/24    Specimen: Blood Updated: 02/07/24 0346     EXTGTT3 128     Comment: 194, 165, 128       GTT Fasting [[id number]] Resulted: 01/09/24    Specimen: Blood Updated: 02/07/24 0346     External Glucose, GTT - Fasting 81 mg/dL     GTT 1 Hour [[id number]] Resulted: 01/09/24    Specimen: Blood Updated: 02/07/24 0346     External GTT 1 Hour 194    Hepatitis B Surface Antigen [[id number]] Resulted: 08/10/23    Specimen: Blood Updated: 02/07/24 0346     External Hepatitis B Surface Ag Negative    RPR [[id number]] Resulted: 08/10/23    Specimen: Blood Updated: 02/07/24 0346     External RPR Non-Reactive    POC Glucose Once [[id number]]  (Normal) Collected: 02/07/24 0244    Specimen: Blood Updated: 02/07/24 0245     Glucose 126 mg/dL     T Pallidum Antibody w/ reflex RPR [[id number]]  (Normal) Collected: 02/06/24 1856    Specimen: Blood Updated: 02/07/24 0030     Treponemal AB Total Non-Reactive    Fentanyl, Urine - Urine, Clean Catch [[id number]]  (Normal) Collected: 02/06/24 1857    Specimen: Urine, Clean Catch Updated: 02/06/24 2012     Fentanyl, Urine Negative    Narrative:      Negative Threshold:      Fentanyl 5 ng/mL     The normal value for the drug tested is negative. This report includes final unconfirmed screening results to be used for medical treatment purposes only. Unconfirmed results must not be used for non-medical purposes such as employment or legal testing. Clinical consideration should be applied to any drug of abuse test, particularly when unconfirmed results are  used.           Urine Drug Screen - Urine, Clean Catch [[id number]]  (Normal) Collected: 02/06/24 1857    Specimen: Urine, Clean Catch Updated: 02/06/24 2006     THC, Screen, Urine Negative     Phencyclidine (PCP), Urine Negative     Cocaine Screen, Urine Negative     Methamphetamine, Ur Negative     Opiate Screen Negative     Amphetamine Screen, Urine Negative     Benzodiazepine Screen, Urine Negative     Tricyclic Antidepressants Screen Negative     Methadone Screen, Urine Negative     Barbiturates Screen, Urine Negative     Oxycodone Screen, Urine Negative     Buprenorphine, Screen, Urine Negative    Narrative:      Cutoff For Drugs Screened:    Amphetamines               500 ng/ml  Barbiturates               200 ng/ml  Benzodiazepines            150 ng/ml  Cocaine                    150 ng/ml  Methadone                  200 ng/ml  Opiates                    100 ng/ml  Phencyclidine               25 ng/ml  THC                         50 ng/ml  Methamphetamine            500 ng/ml  Tricyclic Antidepressants  300 ng/ml  Oxycodone                  100 ng/ml  Buprenorphine               10 ng/ml    The normal value for all drugs tested is negative. This report includes unconfirmed screening results, with the cutoff values listed, to be used for medical treatment purposes only.  Unconfirmed results must not be used for non-medical purposes such as employment or legal testing.  Clinical consideration should be applied to any drug of abuse test, particularly when unconfirmed results are used.      Preeclampsia Panel [[id number]]  (Abnormal) Collected: 02/06/24 1856    Specimen: Blood Updated: 02/06/24 2005     Alkaline Phosphatase 164 U/L      ALT (SGPT) 10 U/L      AST (SGOT) 18 U/L      Creatinine 0.44 mg/dL      Total Bilirubin 0.2 mg/dL       U/L      Uric Acid 4.6 mg/dL     Group B Streptococcus Culture - Swab, Vagina [[id number]] Collected: 02/06/24 1857    Specimen: Swab from Vagina Updated: 02/06/24 1947     CBC (No Diff) [[id number]]  (Abnormal) Collected: 24    Specimen: Blood Updated: 24     WBC 16.50 10*3/mm3      RBC 4.61 10*6/mm3      Hemoglobin 12.7 g/dL      Hematocrit 38.0 %      MCV 82.4 fL      MCH 27.5 pg      MCHC 33.4 g/dL      RDW 13.2 %      RDW-SD 39.3 fl      MPV 11.0 fL      Platelets 212 10*3/mm3     Urinalysis With Microscopic If Indicated (No Culture) - Urine, Clean Catch [[id number]]  (Abnormal) Collected: 24    Specimen: Urine, Clean Catch Updated: 24     Color, UA Yellow     Appearance, UA Clear     pH, UA 6.0     Specific Gravity, UA 1.006     Glucose, UA Negative     Ketones, UA Trace     Bilirubin, UA Negative     Blood, UA Large (3+)     Protein, UA Negative     Leuk Esterase, UA Negative     Nitrite, UA Negative     Urobilinogen, UA 0.2 E.U./dL    Urinalysis, Microscopic Only - Urine, Clean Catch [[id number]] Collected: 24    Specimen: Urine, Clean Catch Updated: 24     RBC, UA 0-2 /HPF      WBC, UA 0-2 /HPF      Bacteria, UA None Seen /HPF      Squamous Epithelial Cells, UA 0-2 /HPF      Hyaline Casts, UA 0-6 /LPF      Methodology Automated Microscopy          Imaging Results (Last 24 Hours)       Procedure Component Value Units Date/Time    [facility] [[id number]] Collected: 2436     Updated: 24    Narrative:      PAT NAME: DOUGLAS HALE  [facility] REC#: [id number]  BIRTH DA: 2000  PAT GEND: F  ACCOUNT#: [id number]  PAT TYPE: I  EXAM BAYLEE: [id number]  REF PHYS MILLY ZAMORA  ACCESSION [id number]      Comparison Studies  =================    There are no relevant prior studies to which this study is being compared      Patient Status  ============    Inpatient-Portable      Indication  ========    PTL, GDM      Maternal Assessment  ==================    Height 165 cm  Height (ft) 5 ft  Height (in) 5 in  Weight 59 kg  Weight (lb) 130 lb  BMI 21.67  kg/m²      Method  =======    Transabdominal ultrasound examination. View: Adequate view      Pregnancy  =========    Quinn pregnancy. Number of fetuses: 1      Dating  ======    Method of dating: based on stated RADHA  GA by prior assessment 32 w + 3 d  RADHA by prior assessment: 3/31/2024  Ultrasound examination on: 2/7/2024  GA by U/S based upon: AC, BPD, Femur, HC  GA by U/S 33 w + 3 d  RADHA by U/S: 3/24/2024  Assigned: based on stated RADHA, selected on 02/7/2024  Assigned GA 32 w + 3 d  Assigned RADHA: 3/31/2024  Pregnancy length 280 d      Fetal Biometry  ============    Standard  BPD 85.3 mm  34w 3d        91%        Hadlock    OFD 99.7 mm  32w 2d        45%        Mahnaz    .1 mm  32w 4d        18%        Hadlock    Cerebellum tr 42.9 mm  33w 4d        66%        Hill    .4 mm  33w 3d        78%        Hadlock    Femur 64.5 mm  33w 2d        62%        Hadlock    Humerus 56.1 mm  32w 4d        61%        Mahnaz    HC / AC 1.00    EFW 2,184 g          61%        Leif    EFW (lb) 4 lb  EFW (oz) 13 oz  EFW by: Hadlock (BPD-HC-AC-FL)  Extended  Tibia 53.2 mm  31w 4d        33%        Mahnaz    Fibula 57.3 mm  35w 2d        77%        Mahnaz    Foot 54.0 mm          <1%        Chitty    Radius 45.3 mm  32w 2d        49%        Mahnaz    Ulna 46.8 mm  29w 5d        2%        Mahnaz    Cav. septi pel. tr 6.2 mm   6.8 mm  CM 5.0 mm          4%        Nicolaides    Nasal bone 9.7 mm    Head / Face / Neck  Cephalic index 0.86          94%        Nicolaides    Extremities / Bony Struc  FL / BPD 0.76    FL / HC 0.22    FL / AC 0.22    Other Structures   bpm      General Evaluation  ================    Cardiac activity present.  bpm.  Fetal movements present.  Presentation cephalic.  Placenta posterior.  Umbilical cord Cord vessels: 3 vessel cord. Insertion site: placental insertion: normal.  Amniotic fluid Amount of AF: normal. MVP 5.4 cm. SPEEDY 14.4 cm. Q1 5.4 cm, Q2 2.5 cm, Q3 1.8 cm, Q4 4.8  cm.      Fetal Anatomy  ============    Cranium: Appears normal  Midline falx: Appears normal  Cavum septi pellucidi: Appears normal  Cerebellum: Appears normal  Cisterna magna: Appears normal  Head / Neck  Rt lateral ventricle: Appears normal  Lt lateral ventricle: Appears normal  Rt choroid plexus: Appears normal  Lt choroid plexus: Appears normal  Vermis: Appears normal  Neck: Appears normal  Nuchal fold: Appears normal  Lips: Appear normal  Profile: Appears normal  Nose: Appears normal  Face  Nose: Nasal bone present  Palate: Appears normal  Orbits: Appears normal  Lens: Normal  4-chamber view: Appears normal  RVOT view: Appears normal  LVOT view: Appears normal  Heart / Thorax  Aortic arch view: Appears normal  Ductal arch view: Appears normal  SVC: normal  IVC: normal  3-vessel view: Appears normal  3-vessel-trachea view: Appears normal  Rt lung: Appears normal  Lt lung: normal  Diaphragm: Appears normal  Diaphragm: Intact  Cord insertion: Appears normal  Stomach: Appears normal  Bladder: Appears normal  Abdomen  Rt kidney: normal  Lt kidney: normal  Liver: normal  Small bowel: normal  Large bowel: normal  Cervical spine: Appears normal  Thoracic spine: Appears normal  Lumbar spine: Appears normal  Sacral spine: Appears normal  Arms: Appears normal  Legs: Appears normal  Rt upper arm: Appears normal  Rt forearm: Appears normal  Rt hand: Appears normal  Lt upper arm: suboptimal  Lt forearm: suboptimal  Lt hand: Appears normal  Rt upper leg: Appears normal  Rt lower leg: Appears normal  Rt foot: Appears normal  Lt upper leg: suboptimal  Lt lower leg: suboptimal  Lt foot: Appears normal  Gender: female  Wants to know gender: yes      Fetal Doppler  ===========    Arterial  Umbilical artery: visualized  Umbilical A details: elevated  Umbilical A PI 1.37          99%        Lianet    Umbilical A RI 0.80          99%        Lianet    Umbilical A PS 60.50 cm/s          92%        Ebbing    Umbilical A ED 14.81  cm/s  Umbilical A TAmax 35.99 cm/s          75%        Ebbing    Umbilical A MD 14.51 cm/s  Umbilical A S / D 4.93          >99%        Lianet    Umbilical A  bpm      Biophysical Profile  ===============    2: Fetal breathing movements  2: Gross body movements  2: Fetal tone  2: Amniotic fluid volume   Biophysical profile score      Maternal Structures  ================    Uterus / Cervix  Cervix: Visualized  Approach: Transabdominal  Cervical length 21.1 mm  Ovaries / Tubes / Adnexa  Rt ovary: Suboptimal  Lt ovary: Suboptimal      Impression  =========    Size consistent with dates.    No fetal anomalies were identified.    Amniotic fluid volume is normal.    Umbilical artery S/D ratio is mildly elevated.    BPP .      Recommendation  ===============    Continue in hospital management.      Coding  ======    Description: 97379-48 Detailed Ultrasound  Description: 84639-53 BPP without NST  Description: 86208-61 Doppler Umbilical Artery        Sonographer: Sharmin Paez RDMS  Physician: Doug Milligan, MD, FACOG    Electronically signed by: Doug Milligan, MD, FACOG at:  07:51          Orders (last 24 hrs)        Start     Ordered    24 1615  betamethasone acetate-betamethasone sodium phosphate (CELESTONE SOLUSPAN) injection 12 mg  Once         24 1820    24 1132  POC Glucose Once  PROCEDURE ONCE        Comments: Complete no more than 45 minutes prior to patient eating      24 1129    24 0800  [facility]  Diagnostic Center  1 Time Imaging         24 1820    24 0800  Inpatient Maternal & Fetal Medicine Consult  Once        Specialty:  Maternal and Fetal Medicine  Provider:  (Not yet assigned)    24 1821    24 0724  POC Glucose Once  PROCEDURE ONCE        Comments: Complete no more than 45 minutes prior to patient eating      24 0721    24 0246  POC Glucose Once  PROCEDURE ONCE        Comments: Complete no more than 45 minutes  prior to patient eating      02/07/24 0244    02/07/24 0132  Bed Rest With Bathroom Privileges  Once         02/07/24 0132    02/07/24 0000  Hepatitis C Antibody        Comments: This is an external result entered through the Results Console.      02/07/24 0346    02/07/24 0000  GTT 3 Hour        Comments: This is an external result entered through the Results Console.      02/07/24 0346    02/07/24 0000  GTT Fasting        Comments: This is an external result entered through the Results Console.      02/07/24 0346    02/07/24 0000  GTT 1 Hour        Comments: This is an external result entered through the Results Console.      02/07/24 0346    02/07/24 0000  Hepatitis B Surface Antigen        Comments: This is an external result entered through the Results Console.      02/07/24 0346    02/07/24 0000  RPR        Comments: This is an external result entered through the Results Console.      02/07/24 0346    02/07/24 0000  Rubella Antibody, IgG        Comments: This is an external result entered through the Results Console.      02/07/24 0346    02/07/24 0000  HIV-1 Antibody, EIA        Comments: This is an external result entered through the Results Console.      02/07/24 0346    02/07/24 0000  Varicella Zoster Antibody, IgG        Comments: This is an external result entered through the Results Console.      02/07/24 0346    02/06/24 2300  ampicillin 1000 mg IVPB in 100 mL NS (MBP)  Every 4 Hours        See Hyperspace for full Linked Orders Report.    02/06/24 1820 02/06/24 2100  sodium chloride 0.9 % flush 10 mL  Every 12 Hours Scheduled         02/06/24 1820 02/06/24 2000  Vital Signs q 4 while awake  Every 4 Hours      Comments: While the patient is awake.    02/06/24 1820 02/06/24 1943  ABO RH Specimen Verification  STAT         02/06/24 1942 02/06/24 1920  Urinalysis, Microscopic Only - Urine, Clean Catch  Once         02/06/24 1919 02/06/24 1915  magnesium sulfate 20 GM/500ML infusion   Continuous         02/06/24 1820    02/06/24 1915  Fentanyl, Urine - Urine, Clean Catch  Once         02/06/24 1914 02/06/24 1900  POC Glucose 4x Daily Fasting & PC  4 Times Daily Fasting & After Meals      Comments: Complete no more than 45 minutes prior to patient eating      02/06/24 1820    02/06/24 1900  ampicillin 2000 mg IVPB in 100 mL NS (MBP)  Once        [facility] Report.    02/06/24 1820 02/06/24 1838  Group B Streptococcus Culture - Swab, Vagina  Once        Comments: Per rectum vagina      02/06/24 1838    02/06/24 1828  Urinalysis With Microscopic If Indicated (No Culture) - Urine, Clean Catch  Once         02/06/24 1827 02/06/24 1828  Urine Drug Screen - Urine, Clean Catch  Once         02/06/24 1827 02/06/24 1818  Diet: Diabetic Diets; Gestational Consistent Carbohydrate; Texture: Regular Texture (IDDSI 7); Fluid Consistency: Thin (IDDSI 0)  Diet Effective Now         02/06/24 1820    02/06/24 1818  Preeclampsia Panel  STAT         02/06/24 1820 02/06/24 1817  Admit To Obstetrics Inpatient  Once         02/06/24 1820    02/06/24 1817  Code Status and Medical Interventions:  Continuous         02/06/24 1820    02/06/24 1817  Place Sequential Compression Device  Once         02/06/24 1820    02/06/24 1817  Maintain Sequential Compression Device  Continuous         02/06/24 1820    02/06/24 1817  Vital Signs Per hospital policy  Per Hospital Policy         02/06/24 1820    02/06/24 1817  Intermittent Auscultation FOR LOW RISK PATIENTS - NST on Admission Along With Intermittent Auscultation of Fetal Heart Tones.  Per Order Details        Comments: Intermittent Auscultation FOR LOW RISK PATIENTS - NST on Admission Along With Intermittent Auscultation of Fetal Heart Tones.    02/06/24 1820 02/06/24 1817  NST Low risk >24 weeks:  Monitor for 30 minutes BID (Antepartum)  Once        Comments: Fetal monitoring/NST:  Antepartum >24 weeks monitor fetus 30 minutes  twice daily    24 181  Antepartum Patients  <24 Weeks - Document Fetal Heart Tones Daily and PRN.  Per Order Details        Comments: For Antepartum Patients Less Than 24 Weeks - Document Fetal Heart Tones Daily & PRN.    24 181  Notify Physician (specified)  Until Discontinued         24 181  Notify physician for hyperstimulus (per hospital algorithm)  Until Discontinued         24  Notify physician if membranes ruptured, bleeding greater than 1 pad an hour, fetal heart tone abnormality, and severe pain  Until Discontinued         24  Initiate Group Beta Strep (GBS) Prophylaxis Protocol, If Criteria Met  Continuous        Comments: NO TREATMENT RECOMMENDED IF: 1)  Maternal GBS status known negative 2)  Scheduled  birth with intact membranes, not in labor.  3 ) Maternal GBS unknown, no risk factors.   TREAT WITH ANTIBIOTICS IF:  1)  Maternal GBS status is known postive.  2)  Maternal GBS status unknown with these risk factors:  a)  Previous infant affected by GBS infection.  b)  GBS urinary tract infection (UTI) or bacteruria during pregnancy  c)  Unexplained maternal fever in labor (greater than or equal to 100.4F or 38.0C)  d)  Prolonged rupture of the membranes greater than or equal to 18 hours.  e)  Gestational age less than 37 weeks.    24  CBC (No Diff)  Once         24  T Pallidum Antibody w/ reflex RPR  Once         24 181  Type & Screen  Once         24 181  Insert Peripheral IV  Once         24 181  Saline Lock & Maintain IV Access  Continuous         24 181  sodium chloride 0.9 % flush 10 mL  As Needed         24 181  sodium chloride 0.9 % infusion 40 mL  As Needed         24  1816  lidocaine PF 1% (XYLOCAINE) injection 0.5 mL  Once As Needed         02/06/24 1820    02/06/24 1816  lactated ringers bolus 1,000 mL  As Needed         02/06/24 1820    02/06/24 1816  acetaminophen (TYLENOL) tablet 650 mg  Every 4 Hours PRN         02/06/24 1820    02/06/24 1816  ondansetron ODT (ZOFRAN-ODT) disintegrating tablet 8 mg  Every 8 Hours PRN        See Hyperspace for full Linked Orders Report.    02/06/24 1820    02/06/24 1816  ondansetron (ZOFRAN) injection 4 mg  Every 8 Hours PRN        See Hyperspace for full Linked Orders Report.    02/06/24 1820 02/06/24 1816  docusate sodium (COLACE) capsule 100 mg  2 Times Daily PRN         02/06/24 1820    02/06/24 1816  bisacodyl (DULCOLAX) suppository 10 mg  Daily PRN         02/06/24 1820 02/06/24 1730  lactated ringers infusion  Continuous         02/06/24 1944    Unscheduled  Position Change - For Intra-Uterine Resusitation for Hypertonus, HyperStimulation or Non-Reassuring Fetal Status  As Needed       02/06/24 1820    --  Prenatal Vit-Fe Fumarate-FA (Prenatal 27-1) 27-1 MG tablet tablet  Daily         02/06/24 1817                     Physician Progress Notes (last 24 hours)        Marcelo Beltran MD at 02/07/24 0237          Labourist:      Called to see alex due to her complaining that she is hurting much more with contractions.    She had been 1/60 on admission and now she is 2/90 with bulging membranes.    Given that she has changed her cervix will move her to Labour gibbons, but continue the Magnesium and Amp.  Will do expectant management at this time.     Electronically signed by Marcelo Beltran MD at 02/07/24 0239          Consult Notes (last 24 hours)        Milligan, Douglas A, MD at 02/07/24 0718        Consult Orders    1. Inpatient Maternal & Fetal Medicine Consult [[id number]] ordered by Titi Canales DO at 02/06/24 1821                  [facility] consult  Referring Provider: Verónica Kim; ASHLEY Bernal    Chief  "Complaint   Patient presents with    Laboring       Subjective     Patient is a 23 y.o. female  currently at 32w3d, who presents with  labor and vaginal spotting.  Patient began ramila and spotting yesterday morning.  She presented to the hospital and [place].  Initially her cervix was closed.  She progressed to 1 cm dilated despite hydration and terbutaline toco lysis.  She was placed on magnesium sulfate for toco lysis and transferred to [facility] due to her gestational age..  She continued to contract here and progressed to 2 cm dilated before the magnesium significantly decreased or contractions.  Patient's pregnancy has been complicated by gestational diabetes that is diet-controlled.  NKDA  Patient's noticed no fever or chills, and no rupture membranes.  Family history is noncontributory.    Her prenatal care is complicated by  diabetes  GDM A1.  Her previous obstetric/gynecological history is noted for is non-contributory.        Prenatal Information:   Maternal Prenatal Labs  Blood Type ABO Type   Date Value Ref Range Status   2024 O  Final      Rh Status RH type   Date Value Ref Range Status   2024 Positive  Final      Antibody Screen Antibody Screen   Date Value Ref Range Status   2024 Negative  Final      Gonnorhea No results found for: \"GCCX\"   Chlamydia No results found for: \"CLAMYDCU\"   RPR No results found for: \"RPR\"       Rubella No results found for: \"RUBELLAIGGIN\"   Hepatitis B Surface Antigen No results found for: \"HEPBSAG\"   HIV-1 Antibody No results found for: \"LABHIV1\"   Hepatitis C Antibody No results found for: \"HEPCAB\"   Rapid Urin Drug Screen Barbiturates Screen, Urine   Date Value Ref Range Status   2024 Negative Negative Final     Benzodiazepine Screen, Urine   Date Value Ref Range Status   2024 Negative Negative Final     Methadone Screen, Urine   Date Value Ref Range Status   2024 Negative Negative Final     Opiate " "Screen   Date Value Ref Range Status   02/06/2024 Negative Negative Final     THC, Screen, Urine   Date Value Ref Range Status   02/06/2024 Negative Negative Final     Cocaine Screen, Urine   Date Value Ref Range Status   02/06/2024 Negative Negative Final     Amphetamine Screen, Urine   Date Value Ref Range Status   02/06/2024 Negative Negative Final     Buprenorphine, Screen, Urine   Date Value Ref Range Status   02/06/2024 Negative Negative Final     Methamphetamine, Ur   Date Value Ref Range Status   02/06/2024 Negative Negative Final     Oxycodone Screen, Urine   Date Value Ref Range Status   02/06/2024 Negative Negative Final     Tricyclic Antidepressants Screen   Date Value Ref Range Status   02/06/2024 Negative Negative Final      Group B Strep Culture No results found for: \"GBSANTIGEN\"           External Prenatal Results       Pregnancy Outside Results - Transcribed From Office Records - See Scanned Records For Details       Test Value Date Time    ABO  O  02/06/24 2141    Rh  Positive  02/06/24 2141    Antibody Screen  Negative  02/06/24 1856    Varicella IgG ^ <135  08/10/23     Rubella ^ Immune  08/10/23     Hgb  12.7 g/dL 02/06/24 1856    Hct  38.0 % 02/06/24 1856    Glucose Fasting GTT ^ 81 mg/dL 01/09/24     Glucose Tolerance Test 1 hour ^ 194  01/09/24     Glucose Tolerance Test 3 hour ^ 128  01/09/24     Gonorrhea (discrete)       Chlamydia (discrete)       RPR ^ Non-Reactive  08/10/23     VDRL       Syphilis Antibody       HBsAg ^ Negative  08/10/23     Herpes Simplex Virus PCR       Herpes Simplex VIrus Culture       HIV ^ Non-Reactive  08/10/23     Hep C RNA Quant PCR       Hep C Antibody ^ Non-Reactive  08/10/23     AFP       Group B Strep       GBS Susceptibility to Clindamycin       GBS Susceptibility to Erythromycin       Fetal Fibronectin       Genetic Testing, Maternal Blood                 Drug Screening       Test Value Date Time    Urine Drug Screen       Amphetamine Screen  Negative  " 24    Barbiturate Screen  Negative  24    Benzodiazepine Screen  Negative  24    Methadone Screen  Negative  24    Phencyclidine Screen  Negative  24    Opiates Screen  Negative  24    THC Screen  Negative  24    Cocaine Screen       Propoxyphene Screen       Buprenorphine Screen  Negative  24    Methamphetamine Screen       Oxycodone Screen  Negative  24    Tricyclic Antidepressants Screen  Negative  24              Legend    ^: Historical                              Past OB History:       OB History    Para Term  AB Living   1 0 0 0 0 0   SAB IAB Ectopic Molar Multiple Live Births   0 0 0 0 0 0      # Outcome Date GA Lbr Luis/2nd Weight Sex Delivery Anes PTL Lv   1 Current                Past Medical History: Past Medical History:   Diagnosis Date    Gestational diabetes       Past Surgical History Past Surgical History:   Procedure Laterality Date    TONSILLECTOMY        Family History: History reviewed. No pertinent family history.   Social History:  reports that she has never smoked. She has never used smokeless tobacco.   reports no history of alcohol use.   reports no history of drug use.        General ROS: Pertinent items are noted in HPI, all other systems reviewed and negative    Objective       Vital Signs Range for the last 24 hours  Temperature: Temp:  [97.9 °F (36.6 °C)-98.5 °F (36.9 °C)] 98 °F (36.7 °C)   Temp Source: Temp src: Oral   BP: BP: (102-130)/(59-78) 102/65   Pulse: Heart Rate:  [] 92   Respirations: Resp:  [16-18] 16               Weight: Weight:  [59 kg (130 lb)] 59 kg (130 lb)     Physical Examination: General appearance - alert, well appearing, and in no distress  Mental status - alert, oriented to person, place, and time  Eyes - sclera anicteric, left eye normal, right eye normal  Ears - bilateral TM's and external ear canals normal  Mouth - mucous membranes  moist, pharynx normal without lesions  Neck - supple, no significant adenopathy  Chest - no tachypnea, retractions or cyanosis  Heart - normal rate and regular rhythm  Abdomen - gravid, nontender  Back exam - full range of motion, no tenderness, palpable spasm or pain on motion  Neurological - alert, oriented, normal speech, no focal findings or movement disorder noted  Musculoskeletal - no joint tenderness, deformity or swelling  Extremities - peripheral pulses normal, no pedal edema, no clubbing or cyanosis  Skin - normal coloration and turgor, no rashes, no suspicious skin lesions noted    Presentation: vertex   Cervix: Exam by: Method: sterile exam per physician   Dilation:     Effacement: Cervical Effacement: 90%   Station:         Fetal Heart Rate Assessment   Method: Fetal HR Assessment Method: external   Beats/min: Fetal HR (beats/min): 130   Baseline: Fetal HR Baseline: normal range   Varibility: Fetal HR Variability: moderate (amplitude range 6 to 25 bpm)   Accels: Fetal HR Accelerations: greater than/equal to 15 bpm, lasting at least 15 seconds   Decels: Fetal HR Decelerations: absent   Tracing Category:       Uterine Assessment   Method: Method: external tocotransducer   Frequency (min): Contraction Frequency (Minutes): x1   Ctx Count in 10 min:     Duration:     Intensity: Contraction Intensity: no contractions   Intensity by IUPC:     Resting Tone: Uterine Resting Tone: soft by palpation   Resting Tone by IUPC:     Holt Units:       Laboratory Results: nl PLTS, LFTs  Radiology Review: S=D  see Viewpoint by me today  Other Studies: reactive tracing, occational ctx    Assessment & Plan       * No active hospital problems. *        Assessment:  1.  Intrauterine pregnancy at 32w3d weeks gestation with reactive fetal status.    2.   labor  without ROM  Gestational Diabetes  3.  Obstetrical history significant for is non-contributory.  4.  GBS status: No results found for:  "\"GBSANTIGEN\"    Plan:  1. fetal and uterine monitoring  continuously, tocolysis with magnesium sulfate, IV antibiotics, and IV steroids for fetal benefit  2. Plan of care has been reviewed with patient.  3.  Risks, benefits of treatment plan have been discussed.  4.  All questions have been answered.      Douglas A. Milligan, MD  2/7/2024  07:18 EST    Electronically signed by Milligan, Douglas A, MD at 02/07/24 0739       FHR (last day)       Date/Time Fetal HR Assessment Method Fetal HR (beats/min) Fetal HR Baseline Fetal HR Variability Fetal HR Accelerations Fetal HR Decelerations Fetal HR Tracing Category    02/07/24 1030 external 130 normal range moderate (amplitude range 6 to 25 bpm) absent absent --    02/07/24 1015 external 125 normal range moderate (amplitude range 6 to 25 bpm) greater than/equal to 15 bpm;lasting at least 15 seconds absent --    02/07/24 1000 external 130 normal range moderate (amplitude range 6 to 25 bpm) greater than/equal to 15 bpm;lasting at least 15 seconds absent --    02/07/24 0945 external 130 normal range moderate (amplitude range 6 to 25 bpm) greater than/equal to 15 bpm;lasting at least 15 seconds absent --    02/07/24 0930 external 130 normal range moderate (amplitude range 6 to 25 bpm) absent absent --    02/07/24 0700 external 130 normal range moderate (amplitude range 6 to 25 bpm) greater than/equal to 15 bpm;lasting at least 15 seconds absent --    02/07/24 0630 external 125 normal range minimal (detectable, amplitude less than or equal to 5 bpm) greater than/equal to 15 bpm;lasting at least 15 seconds absent --    02/07/24 0600 external 130 normal range moderate (amplitude range 6 to 25 bpm) greater than/equal to 15 bpm;lasting at least 15 seconds absent --    02/07/24 0530 external 130 normal range moderate (amplitude range 6 to 25 bpm) greater than/equal to 15 bpm;lasting at least 15 seconds absent --    02/07/24 0500 external 130 normal range moderate (amplitude range 6 " to 25 bpm) greater than/equal to 15 bpm;lasting at least 15 seconds absent --    02/07/24 0430 external 125 normal range moderate (amplitude range 6 to 25 bpm) greater than/equal to 15 bpm;lasting at least 15 seconds absent --    02/07/24 0400 external 125 normal range minimal (detectable, amplitude less than or equal to 5 bpm) greater than/equal to 15 bpm;lasting at least 15 seconds absent --    02/07/24 0330 external 125 normal range moderate (amplitude range 6 to 25 bpm) greater than/equal to 15 bpm;lasting at least 15 seconds absent --    02/07/24 0300 external 135 normal range moderate (amplitude range 6 to 25 bpm) greater than/equal to 15 bpm;lasting at least 15 seconds absent --    02/07/24 0230 external 130 normal range moderate (amplitude range 6 to 25 bpm) absent absent --    02/07/24 0200 external 120 normal range moderate (amplitude range 6 to 25 bpm) greater than/equal to 15 bpm;lasting at least 15 seconds absent --    02/06/24 2245 external 125 normal range moderate (amplitude range 6 to 25 bpm) greater than/equal to 15 bpm;lasting at least 15 seconds absent --    02/06/24 2230 external 125 normal range moderate (amplitude range 6 to 25 bpm) greater than/equal to 15 bpm;lasting at least 15 seconds absent --    02/06/24 2200 external 125 normal range moderate (amplitude range 6 to 25 bpm) greater than/equal to 15 bpm;lasting at least 15 seconds absent --    02/06/24 2130 external 125 normal range moderate (amplitude range 6 to 25 bpm) greater than/equal to 15 bpm;lasting at least 15 seconds absent --    02/06/24 2100 external 125 normal range moderate (amplitude range 6 to 25 bpm) greater than/equal to 15 bpm;lasting at least 15 seconds absent --    02/06/24 2030 external 125 normal range moderate (amplitude range 6 to 25 bpm) greater than/equal to 15 bpm;lasting at least 15 seconds absent --    02/06/24 2000 external 135 normal range moderate (amplitude range 6 to 25 bpm) greater than/equal to 15  bpm;lasting at least 15 seconds absent --    02/06/24 1930 external 135 normal range moderate (amplitude range 6 to 25 bpm) greater than/equal to 15 bpm;lasting at least 15 seconds absent --    02/06/24 1900 external 135 normal range moderate (amplitude range 6 to 25 bpm) greater than/equal to 15 bpm;lasting at least 15 seconds absent --    02/06/24 1830 external 135 normal range moderate (amplitude range 6 to 25 bpm) greater than/equal to 15 bpm;lasting at least 15 seconds absent --          Uterine Activity (last day)       Date/Time Method Contraction Frequency (Minutes) Contraction Duration (sec) Contraction Intensity Uterine Resting Tone Contraction Pattern    02/07/24 1135 palpation -- -- -- soft by palpation --    02/07/24 1030 external tocotransducer x1 60 -- -- --    02/07/24 1015 external tocotransducer -- -- no contractions -- --    02/07/24 1000 external tocotransducer x2 30-50 -- -- --    02/07/24 0945 external tocotransducer -- -- no contractions -- --    02/07/24 0930 external tocotransducer;palpation x1 90 -- soft by palpation --    02/07/24 0825 palpation -- -- mild by palpation soft by palpation --    02/07/24 0730 -- -- -- -- soft by palpation --    02/07/24 0700 external tocotransducer -- 60-80 -- -- --    02/07/24 0630 external tocotransducer -- 60-90 mild by palpation soft by palpation --    02/07/24 0600 external tocotransducer -- 60 -- -- --    02/07/24 0530 external tocotransducer -- 70 -- -- --    02/07/24 0500 external tocotransducer -- 120 -- -- --    02/07/24 0430 external tocotransducer -- 80 -- -- --    02/07/24 0400 external tocotransducer -- 80-90 -- -- --    02/07/24 0330 external tocotransducer -- -- no contractions -- --    02/07/24 0300 external tocotransducer -- 50-80 -- -- --    02/07/24 0230 external tocotransducer x1 80 mild by palpation soft by palpation --    02/07/24 0200 external tocotransducer x1 60 mild by palpation soft by palpation --    02/06/24 2245 external  tocotransducer x1 120 -- soft by palpation --    02/06/24 2230 external tocotransducer x1 160 -- soft by palpation --    02/06/24 2200 external tocotransducer x1 60 -- -- --    02/06/24 2130 external tocotransducer x3 60-80 -- soft by palpation --    02/06/24 2100 external tocotransducer x2 60-80 -- soft by palpation --    02/06/24 2030 external tocotransducer x3  -- soft by palpation --    02/06/24 2000 external tocotransducer x3  -- soft by palpation --    02/06/24 1930 external tocotransducer 5-6 80-90 -- soft by palpation --    02/06/24 1900 external tocotransducer 2-7 40-80 -- soft by palpation --    02/06/24 1830 external tocotransducer 2-6  mild by palpation soft by palpation --

## 2024-02-08 LAB
GLUCOSE BLDC GLUCOMTR-MCNC: 102 MG/DL (ref 70–130)
GLUCOSE BLDC GLUCOMTR-MCNC: 103 MG/DL (ref 70–130)
GLUCOSE BLDC GLUCOMTR-MCNC: 125 MG/DL (ref 70–130)
GLUCOSE BLDC GLUCOMTR-MCNC: 128 MG/DL (ref 70–130)

## 2024-02-08 PROCEDURE — 25010000002 MAGNESIUM SULFATE 20 GM/500ML SOLUTION: Performed by: OBSTETRICS & GYNECOLOGY

## 2024-02-08 PROCEDURE — 82948 REAGENT STRIP/BLOOD GLUCOSE: CPT

## 2024-02-08 PROCEDURE — 99232 SBSQ HOSP IP/OBS MODERATE 35: CPT | Performed by: OBSTETRICS & GYNECOLOGY

## 2024-02-08 PROCEDURE — 59025 FETAL NON-STRESS TEST: CPT

## 2024-02-08 PROCEDURE — 25010000002 MORPHINE PER 10 MG: Performed by: OBSTETRICS & GYNECOLOGY

## 2024-02-08 PROCEDURE — 25010000002 AMPICILLIN PER 500 MG: Performed by: OBSTETRICS & GYNECOLOGY

## 2024-02-08 PROCEDURE — 63710000001 PROMETHAZINE PER 25 MG: Performed by: OBSTETRICS & GYNECOLOGY

## 2024-02-08 PROCEDURE — 59025 FETAL NON-STRESS TEST: CPT | Performed by: OBSTETRICS & GYNECOLOGY

## 2024-02-08 RX ORDER — MORPHINE SULFATE 10 MG/ML
15 INJECTION INTRAMUSCULAR; INTRAVENOUS; SUBCUTANEOUS EVERY 4 HOURS PRN
Status: DISCONTINUED | OUTPATIENT
Start: 2024-02-08 | End: 2024-02-09

## 2024-02-08 RX ORDER — PROMETHAZINE HYDROCHLORIDE 25 MG/1
25 TABLET ORAL EVERY 6 HOURS PRN
Status: DISCONTINUED | OUTPATIENT
Start: 2024-02-08 | End: 2024-02-09

## 2024-02-08 RX ORDER — MORPHINE SULFATE 10 MG/ML
15 INJECTION INTRAMUSCULAR; INTRAVENOUS; SUBCUTANEOUS EVERY 4 HOURS PRN
Status: DISCONTINUED | OUTPATIENT
Start: 2024-02-08 | End: 2024-02-08

## 2024-02-08 RX ADMIN — MAGNESIUM SULFATE HEPTAHYDRATE 2 G/HR: 40 INJECTION, SOLUTION INTRAVENOUS at 06:29

## 2024-02-08 RX ADMIN — AMPICILLIN SODIUM 1 G: 1 INJECTION, POWDER, FOR SOLUTION INTRAMUSCULAR; INTRAVENOUS at 02:45

## 2024-02-08 RX ADMIN — PROMETHAZINE HYDROCHLORIDE 25 MG: 25 TABLET ORAL at 21:17

## 2024-02-08 RX ADMIN — AMPICILLIN SODIUM 1 G: 1 INJECTION, POWDER, FOR SOLUTION INTRAMUSCULAR; INTRAVENOUS at 16:00

## 2024-02-08 RX ADMIN — Medication 10 ML: at 21:00

## 2024-02-08 RX ADMIN — MORPHINE SULFATE 15 MG: 10 INJECTION INTRAVENOUS at 21:42

## 2024-02-08 RX ADMIN — AMPICILLIN SODIUM 1 G: 1 INJECTION, POWDER, FOR SOLUTION INTRAMUSCULAR; INTRAVENOUS at 19:56

## 2024-02-08 RX ADMIN — AMPICILLIN SODIUM 1 G: 1 INJECTION, POWDER, FOR SOLUTION INTRAMUSCULAR; INTRAVENOUS at 06:29

## 2024-02-08 RX ADMIN — AMPICILLIN SODIUM 1 G: 1 INJECTION, POWDER, FOR SOLUTION INTRAMUSCULAR; INTRAVENOUS at 11:30

## 2024-02-08 NOTE — PROGRESS NOTES
Indira Woodson  [id number]  2000    Referring physician: Verónica Kim M.D.    Chief complaint:  labor    S/ c/o occas contractions, some spotting.  Good FM.  No leaking.  Pt denies HA, CP,   SOB  O/ 108/57, 17, 87, 98.3, 98%(RA)   Lungs: CTA   Heart: RRR, no m,g,r   Abd: +BS, soft, non-tend   Ext: no calf tend   FHT's: indication:  labor, onset 2135, offset 2234, baselin 125, mod    BTB variability, mult accels (15 X 15), no decels, rare contractions,    Interpretation: reactive NST   Labs: BS's     A/1)IUP 32 4/7 weeks-  steroid therapeutic this afternoon     2) labor     3)gest DM- diet controlled, BS's normal  P/1)d/c magnesium when steroids are therapeutic     2)expectant management after magnesium d/c'ed    Chato Fitch MD  2024  07:18 EST

## 2024-02-08 NOTE — PROGRESS NOTES
"                    Clinical Nutrition     Patient Name: Indira Woodson  YOB: 2000  MRN: [id number]  Date of Encounter: 24 13:55 EST  Admission date: 2024    Comments:   32 4/7 weeks gestation.  labor, GDM     Reason for Visit   LOS and dx of Gestational diabetes     Medical conditions   Admission Diagnosis:   labor [O60.00]    Problem List:    * No active hospital problems. *      Anthropometric      Flowsheet Rows      Flowsheet Row First Filed Value   Admission Height 165.1 cm (65\") Documented at 2024 182   Admission Weight 59 kg (130 lb) Documented at 2024            Height: 165.1 cm (65\")  Last Filed Weight: Weight: 59 kg (130 lb) (24)  Weight Method: Stated  BMI: BMI (Calculated): 21.6  BMI classification: Normal: 18.5-24.9kg/m2   IBW:   130 lb     UBW:   n/a   Weight change:    n/a     Nutrition Focused Physical Exam     Date:    24    Unable to perform exam due to: Defer pending indication     Reported/Observed/Food/Nutrition Related - Comments   No intake recorded for this day.  24  75% of 2 meals recorded.      Current Nutrition Prescription   Diet: Diabetic Diets; Gestational Consistent Carbohydrate; Texture: Regular Texture (IDDSI 7); Fluid Consistency: Thin (IDDSI 0)  No active supplement orders      Average Intake from Charting: Insufficient data     Nutrition Diagnosis   Date:  Updated:     Problem Increased nutrient needs   Etiology Growth of fetus    Signs/Symptoms Third trimester pregnancy    Status:  new     Actions     Follow treatment progress, Encourage intake    Monitor Per Protocol      Kathe Hoskins, JING, LD   Time Spent:  20 min       "

## 2024-02-09 ENCOUNTER — ANESTHESIA (OUTPATIENT)
Dept: LABOR AND DELIVERY | Facility: HOSPITAL | Age: 24
End: 2024-02-09
Payer: MEDICAID

## 2024-02-09 ENCOUNTER — APPOINTMENT (OUTPATIENT)
Dept: GENERAL RADIOLOGY | Facility: HOSPITAL | Age: 24
End: 2024-02-09
Payer: MEDICAID

## 2024-02-09 ENCOUNTER — ANESTHESIA EVENT (OUTPATIENT)
Dept: LABOR AND DELIVERY | Facility: HOSPITAL | Age: 24
End: 2024-02-09
Payer: MEDICAID

## 2024-02-09 LAB — BACTERIA SPEC AEROBE CULT: NORMAL

## 2024-02-09 PROCEDURE — 25010000002 FENTANYL CITRATE (PF) 50 MCG/ML SOLUTION: Performed by: ANESTHESIOLOGY

## 2024-02-09 PROCEDURE — 59514 CESAREAN DELIVERY ONLY: CPT | Performed by: OBSTETRICS & GYNECOLOGY

## 2024-02-09 PROCEDURE — 88307 TISSUE EXAM BY PATHOLOGIST: CPT | Performed by: OBSTETRICS & GYNECOLOGY

## 2024-02-09 PROCEDURE — 25010000002 METOCLOPRAMIDE PER 10 MG: Performed by: ANESTHESIOLOGY

## 2024-02-09 PROCEDURE — 25010000002 FUROSEMIDE PER 20 MG: Performed by: OBSTETRICS & GYNECOLOGY

## 2024-02-09 PROCEDURE — 25010000002 CEFAZOLIN PER 500 MG: Performed by: OBSTETRICS & GYNECOLOGY

## 2024-02-09 PROCEDURE — 25010000002 BUPIVACAINE IN DEXTROSE 0.75-8.25 % SOLUTION: Performed by: ANESTHESIOLOGY

## 2024-02-09 PROCEDURE — 71045 X-RAY EXAM CHEST 1 VIEW: CPT

## 2024-02-09 PROCEDURE — 25010000002 ONDANSETRON PER 1 MG: Performed by: OBSTETRICS & GYNECOLOGY

## 2024-02-09 PROCEDURE — 25010000002 MORPHINE PER 10 MG: Performed by: ANESTHESIOLOGY

## 2024-02-09 PROCEDURE — 25010000002 KETOROLAC TROMETHAMINE PER 15 MG: Performed by: OBSTETRICS & GYNECOLOGY

## 2024-02-09 PROCEDURE — 25010000002 ONDANSETRON PER 1 MG: Performed by: ANESTHESIOLOGY

## 2024-02-09 PROCEDURE — 25010000002 MORPHINE PER 10 MG: Performed by: OBSTETRICS & GYNECOLOGY

## 2024-02-09 PROCEDURE — 25810000003 LACTATED RINGERS PER 1000 ML: Performed by: OBSTETRICS & GYNECOLOGY

## 2024-02-09 PROCEDURE — 25010000002 MIDAZOLAM PER 1 MG: Performed by: ANESTHESIOLOGY

## 2024-02-09 RX ORDER — FENTANYL CITRATE 50 UG/ML
INJECTION, SOLUTION INTRAMUSCULAR; INTRAVENOUS AS NEEDED
Status: DISCONTINUED | OUTPATIENT
Start: 2024-02-09 | End: 2024-02-09 | Stop reason: SURG

## 2024-02-09 RX ORDER — FAMOTIDINE 10 MG/ML
INJECTION, SOLUTION INTRAVENOUS AS NEEDED
Status: DISCONTINUED | OUTPATIENT
Start: 2024-02-09 | End: 2024-02-09 | Stop reason: SURG

## 2024-02-09 RX ORDER — DIPHENHYDRAMINE HYDROCHLORIDE 50 MG/ML
25 INJECTION INTRAMUSCULAR; INTRAVENOUS ONCE AS NEEDED
Status: DISCONTINUED | OUTPATIENT
Start: 2024-02-09 | End: 2024-02-12 | Stop reason: HOSPADM

## 2024-02-09 RX ORDER — DOCUSATE SODIUM 100 MG/1
100 CAPSULE, LIQUID FILLED ORAL 2 TIMES DAILY PRN
Status: DISCONTINUED | OUTPATIENT
Start: 2024-02-09 | End: 2024-02-12 | Stop reason: HOSPADM

## 2024-02-09 RX ORDER — ACETAMINOPHEN 500 MG
1000 TABLET ORAL EVERY 6 HOURS
Status: COMPLETED | OUTPATIENT
Start: 2024-02-09 | End: 2024-02-10

## 2024-02-09 RX ORDER — LIDOCAINE HYDROCHLORIDE 10 MG/ML
0.5 INJECTION, SOLUTION EPIDURAL; INFILTRATION; INTRACAUDAL; PERINEURAL ONCE AS NEEDED
Status: DISCONTINUED | OUTPATIENT
Start: 2024-02-09 | End: 2024-02-09

## 2024-02-09 RX ORDER — MIDAZOLAM HYDROCHLORIDE 1 MG/ML
INJECTION INTRAMUSCULAR; INTRAVENOUS AS NEEDED
Status: DISCONTINUED | OUTPATIENT
Start: 2024-02-09 | End: 2024-02-09 | Stop reason: SURG

## 2024-02-09 RX ORDER — ALUMINA, MAGNESIA, AND SIMETHICONE 2400; 2400; 240 MG/30ML; MG/30ML; MG/30ML
15 SUSPENSION ORAL EVERY 4 HOURS PRN
Status: DISCONTINUED | OUTPATIENT
Start: 2024-02-09 | End: 2024-02-12 | Stop reason: HOSPADM

## 2024-02-09 RX ORDER — OXYCODONE HYDROCHLORIDE 10 MG/1
10 TABLET ORAL EVERY 4 HOURS PRN
Status: DISCONTINUED | OUTPATIENT
Start: 2024-02-09 | End: 2024-02-12 | Stop reason: HOSPADM

## 2024-02-09 RX ORDER — DIPHENHYDRAMINE HCL 25 MG
25 CAPSULE ORAL EVERY 4 HOURS PRN
Status: DISCONTINUED | OUTPATIENT
Start: 2024-02-09 | End: 2024-02-12 | Stop reason: HOSPADM

## 2024-02-09 RX ORDER — CARBOPROST TROMETHAMINE 250 UG/ML
250 INJECTION, SOLUTION INTRAMUSCULAR AS NEEDED
Status: DISCONTINUED | OUTPATIENT
Start: 2024-02-09 | End: 2024-02-12 | Stop reason: HOSPADM

## 2024-02-09 RX ORDER — MORPHINE SULFATE 0.5 MG/ML
INJECTION, SOLUTION EPIDURAL; INTRATHECAL; INTRAVENOUS AS NEEDED
Status: DISCONTINUED | OUTPATIENT
Start: 2024-02-09 | End: 2024-02-09 | Stop reason: SURG

## 2024-02-09 RX ORDER — METHYLERGONOVINE MALEATE 0.2 MG/ML
200 INJECTION INTRAVENOUS ONCE AS NEEDED
Status: DISCONTINUED | OUTPATIENT
Start: 2024-02-09 | End: 2024-02-12 | Stop reason: HOSPADM

## 2024-02-09 RX ORDER — MISOPROSTOL 200 UG/1
800 TABLET ORAL AS NEEDED
Status: DISCONTINUED | OUTPATIENT
Start: 2024-02-09 | End: 2024-02-12 | Stop reason: HOSPADM

## 2024-02-09 RX ORDER — CITRIC ACID/SODIUM CITRATE 334-500MG
30 SOLUTION, ORAL ORAL ONCE
Status: COMPLETED | OUTPATIENT
Start: 2024-02-09 | End: 2024-02-09

## 2024-02-09 RX ORDER — PRENATAL VIT/IRON FUM/FOLIC AC 27MG-0.8MG
1 TABLET ORAL DAILY
Status: DISCONTINUED | OUTPATIENT
Start: 2024-02-09 | End: 2024-02-12 | Stop reason: HOSPADM

## 2024-02-09 RX ORDER — ONDANSETRON 2 MG/ML
4 INJECTION INTRAMUSCULAR; INTRAVENOUS ONCE AS NEEDED
Status: ACTIVE | OUTPATIENT
Start: 2024-02-09 | End: 2024-02-10

## 2024-02-09 RX ORDER — ONDANSETRON 2 MG/ML
INJECTION INTRAMUSCULAR; INTRAVENOUS AS NEEDED
Status: DISCONTINUED | OUTPATIENT
Start: 2024-02-09 | End: 2024-02-09 | Stop reason: SURG

## 2024-02-09 RX ORDER — KETOROLAC TROMETHAMINE 15 MG/ML
15 INJECTION, SOLUTION INTRAMUSCULAR; INTRAVENOUS EVERY 6 HOURS
Status: COMPLETED | OUTPATIENT
Start: 2024-02-09 | End: 2024-02-10

## 2024-02-09 RX ORDER — ACETAMINOPHEN 325 MG/1
650 TABLET ORAL EVERY 6 HOURS
Status: DISCONTINUED | OUTPATIENT
Start: 2024-02-10 | End: 2024-02-11

## 2024-02-09 RX ORDER — FUROSEMIDE 10 MG/ML
20 INJECTION INTRAMUSCULAR; INTRAVENOUS ONCE
Status: COMPLETED | OUTPATIENT
Start: 2024-02-09 | End: 2024-02-09

## 2024-02-09 RX ORDER — KETOROLAC TROMETHAMINE 30 MG/ML
30 INJECTION, SOLUTION INTRAMUSCULAR; INTRAVENOUS ONCE
Status: COMPLETED | OUTPATIENT
Start: 2024-02-09 | End: 2024-02-09

## 2024-02-09 RX ORDER — METOCLOPRAMIDE HYDROCHLORIDE 5 MG/ML
INJECTION INTRAMUSCULAR; INTRAVENOUS AS NEEDED
Status: DISCONTINUED | OUTPATIENT
Start: 2024-02-09 | End: 2024-02-09 | Stop reason: SURG

## 2024-02-09 RX ORDER — SIMETHICONE 80 MG
80 TABLET,CHEWABLE ORAL 4 TIMES DAILY PRN
Status: DISCONTINUED | OUTPATIENT
Start: 2024-02-09 | End: 2024-02-12 | Stop reason: HOSPADM

## 2024-02-09 RX ORDER — OXYTOCIN/0.9 % SODIUM CHLORIDE 30/500 ML
PLASTIC BAG, INJECTION (ML) INTRAVENOUS AS NEEDED
Status: DISCONTINUED | OUTPATIENT
Start: 2024-02-09 | End: 2024-02-09 | Stop reason: SURG

## 2024-02-09 RX ORDER — CALCIUM CARBONATE 500 MG/1
1 TABLET, CHEWABLE ORAL EVERY 4 HOURS PRN
Status: DISCONTINUED | OUTPATIENT
Start: 2024-02-09 | End: 2024-02-12 | Stop reason: HOSPADM

## 2024-02-09 RX ORDER — NALOXONE HCL 0.4 MG/ML
0.4 VIAL (ML) INJECTION
Status: ACTIVE | OUTPATIENT
Start: 2024-02-09 | End: 2024-02-10

## 2024-02-09 RX ORDER — PROMETHAZINE HYDROCHLORIDE 25 MG/1
25 TABLET ORAL ONCE AS NEEDED
Status: DISCONTINUED | OUTPATIENT
Start: 2024-02-09 | End: 2024-02-12 | Stop reason: HOSPADM

## 2024-02-09 RX ORDER — OXYTOCIN/0.9 % SODIUM CHLORIDE 30/500 ML
999 PLASTIC BAG, INJECTION (ML) INTRAVENOUS ONCE
Status: DISCONTINUED | OUTPATIENT
Start: 2024-02-09 | End: 2024-02-12 | Stop reason: HOSPADM

## 2024-02-09 RX ORDER — DIPHENHYDRAMINE HYDROCHLORIDE 50 MG/ML
25 INJECTION INTRAMUSCULAR; INTRAVENOUS EVERY 4 HOURS PRN
Status: DISCONTINUED | OUTPATIENT
Start: 2024-02-09 | End: 2024-02-12 | Stop reason: HOSPADM

## 2024-02-09 RX ORDER — OXYTOCIN/0.9 % SODIUM CHLORIDE 30/500 ML
250 PLASTIC BAG, INJECTION (ML) INTRAVENOUS CONTINUOUS
Status: DISPENSED | OUTPATIENT
Start: 2024-02-09 | End: 2024-02-09

## 2024-02-09 RX ORDER — PROMETHAZINE HYDROCHLORIDE 12.5 MG/1
12.5 SUPPOSITORY RECTAL ONCE AS NEEDED
Status: DISCONTINUED | OUTPATIENT
Start: 2024-02-09 | End: 2024-02-12 | Stop reason: HOSPADM

## 2024-02-09 RX ORDER — SODIUM CHLORIDE, SODIUM LACTATE, POTASSIUM CHLORIDE, CALCIUM CHLORIDE 600; 310; 30; 20 MG/100ML; MG/100ML; MG/100ML; MG/100ML
125 INJECTION, SOLUTION INTRAVENOUS CONTINUOUS
Status: DISCONTINUED | OUTPATIENT
Start: 2024-02-09 | End: 2024-02-09

## 2024-02-09 RX ORDER — OXYCODONE HYDROCHLORIDE 5 MG/1
5 TABLET ORAL EVERY 4 HOURS PRN
Status: DISCONTINUED | OUTPATIENT
Start: 2024-02-09 | End: 2024-02-12 | Stop reason: HOSPADM

## 2024-02-09 RX ORDER — HYDROCORTISONE 25 MG/G
1 CREAM TOPICAL AS NEEDED
Status: DISCONTINUED | OUTPATIENT
Start: 2024-02-09 | End: 2024-02-12 | Stop reason: HOSPADM

## 2024-02-09 RX ORDER — OXYTOCIN/0.9 % SODIUM CHLORIDE 30/500 ML
125 PLASTIC BAG, INJECTION (ML) INTRAVENOUS CONTINUOUS PRN
Status: DISCONTINUED | OUTPATIENT
Start: 2024-02-09 | End: 2024-02-12 | Stop reason: HOSPADM

## 2024-02-09 RX ORDER — IBUPROFEN 600 MG/1
600 TABLET ORAL EVERY 6 HOURS
Status: DISCONTINUED | OUTPATIENT
Start: 2024-02-10 | End: 2024-02-11

## 2024-02-09 RX ORDER — ACETAMINOPHEN 500 MG
1000 TABLET ORAL ONCE
Status: COMPLETED | OUTPATIENT
Start: 2024-02-09 | End: 2024-02-09

## 2024-02-09 RX ORDER — FENTANYL CITRATE 50 UG/ML
50 INJECTION, SOLUTION INTRAMUSCULAR; INTRAVENOUS
Status: DISCONTINUED | OUTPATIENT
Start: 2024-02-09 | End: 2024-02-09

## 2024-02-09 RX ORDER — METOCLOPRAMIDE 10 MG/1
10 TABLET ORAL ONCE AS NEEDED
Status: DISCONTINUED | OUTPATIENT
Start: 2024-02-09 | End: 2024-02-12 | Stop reason: HOSPADM

## 2024-02-09 RX ORDER — BUPIVACAINE HYDROCHLORIDE 7.5 MG/ML
INJECTION, SOLUTION INTRASPINAL
Status: COMPLETED | OUTPATIENT
Start: 2024-02-09 | End: 2024-02-09

## 2024-02-09 RX ADMIN — ONDANSETRON 4 MG: 2 INJECTION INTRAMUSCULAR; INTRAVENOUS at 03:36

## 2024-02-09 RX ADMIN — SODIUM CITRATE AND CITRIC ACID MONOHYDRATE 30 ML: 500; 334 SOLUTION ORAL at 03:31

## 2024-02-09 RX ADMIN — SODIUM CHLORIDE, POTASSIUM CHLORIDE, SODIUM LACTATE AND CALCIUM CHLORIDE: 600; 310; 30; 20 INJECTION, SOLUTION INTRAVENOUS at 03:35

## 2024-02-09 RX ADMIN — DOCUSATE SODIUM 100 MG: 100 CAPSULE, LIQUID FILLED ORAL at 12:25

## 2024-02-09 RX ADMIN — PRENATAL VITAMINS-IRON FUMARATE 27 MG IRON-FOLIC ACID 0.8 MG TABLET 1 TABLET: at 19:50

## 2024-02-09 RX ADMIN — Medication 10 ML: at 06:08

## 2024-02-09 RX ADMIN — KETOROLAC TROMETHAMINE 15 MG: 15 INJECTION, SOLUTION INTRAMUSCULAR; INTRAVENOUS at 12:36

## 2024-02-09 RX ADMIN — ACETAMINOPHEN 1000 MG: 500 TABLET ORAL at 22:26

## 2024-02-09 RX ADMIN — SODIUM CHLORIDE 2000 MG: 900 INJECTION INTRAVENOUS at 03:26

## 2024-02-09 RX ADMIN — MORPHINE SULFATE 4 MG: 4 INJECTION, SOLUTION INTRAMUSCULAR; INTRAVENOUS at 01:57

## 2024-02-09 RX ADMIN — ACETAMINOPHEN 1000 MG: 500 TABLET ORAL at 03:25

## 2024-02-09 RX ADMIN — Medication 10 ML: at 06:27

## 2024-02-09 RX ADMIN — MORPHINE SULFATE 0.1 MG: 0.5 INJECTION, SOLUTION EPIDURAL; INTRATHECAL; INTRAVENOUS at 03:42

## 2024-02-09 RX ADMIN — METOCLOPRAMIDE 10 MG: 5 INJECTION, SOLUTION INTRAMUSCULAR; INTRAVENOUS at 03:55

## 2024-02-09 RX ADMIN — ONDANSETRON 4 MG: 2 INJECTION INTRAMUSCULAR; INTRAVENOUS at 00:12

## 2024-02-09 RX ADMIN — KETOROLAC TROMETHAMINE 15 MG: 15 INJECTION, SOLUTION INTRAMUSCULAR; INTRAVENOUS at 18:35

## 2024-02-09 RX ADMIN — MIDAZOLAM HYDROCHLORIDE 1 MG: 1 INJECTION, SOLUTION INTRAMUSCULAR; INTRAVENOUS at 03:36

## 2024-02-09 RX ADMIN — SODIUM CHLORIDE, POTASSIUM CHLORIDE, SODIUM LACTATE AND CALCIUM CHLORIDE: 600; 310; 30; 20 INJECTION, SOLUTION INTRAVENOUS at 04:24

## 2024-02-09 RX ADMIN — KETOROLAC TROMETHAMINE 30 MG: 30 INJECTION, SOLUTION INTRAMUSCULAR; INTRAVENOUS at 06:08

## 2024-02-09 RX ADMIN — ACETAMINOPHEN 1000 MG: 500 TABLET ORAL at 15:51

## 2024-02-09 RX ADMIN — ACETAMINOPHEN 1000 MG: 500 TABLET ORAL at 10:02

## 2024-02-09 RX ADMIN — FUROSEMIDE 20 MG: 20 INJECTION, SOLUTION INTRAMUSCULAR; INTRAVENOUS at 06:27

## 2024-02-09 RX ADMIN — FUROSEMIDE 20 MG: 10 INJECTION, SOLUTION INTRAMUSCULAR; INTRAVENOUS at 17:40

## 2024-02-09 RX ADMIN — Medication 1000 ML: at 04:03

## 2024-02-09 RX ADMIN — FENTANYL CITRATE 15 MCG: 50 INJECTION, SOLUTION INTRAMUSCULAR; INTRAVENOUS at 03:42

## 2024-02-09 RX ADMIN — BUPIVACAINE HYDROCHLORIDE IN DEXTROSE 1.4 ML: 7.5 INJECTION, SOLUTION SUBARACHNOID at 03:42

## 2024-02-09 RX ADMIN — FAMOTIDINE 20 MG: 10 INJECTION, SOLUTION INTRAVENOUS at 03:36

## 2024-02-09 RX ADMIN — SODIUM CHLORIDE, POTASSIUM CHLORIDE, SODIUM LACTATE AND CALCIUM CHLORIDE 75 ML/HR: 600; 310; 30; 20 INJECTION, SOLUTION INTRAVENOUS at 10:42

## 2024-02-09 NOTE — ANESTHESIA PROCEDURE NOTES
Spinal Block      Patient reassessed immediately prior to procedure    Patient location during procedure: OB  Indication:procedure for pain  Performed By  Anesthesiologist: Vanesa Sorenson DO  Preanesthetic Checklist  Completed: patient identified, IV checked, risks and benefits discussed, surgical consent, monitors and equipment checked, pre-op evaluation and timeout performed  Spinal Block Prep:  Patient Position:sitting  Sterile Tech:cap, gloves, mask and sterile barriers  Prep:Chloraprep  Patient Monitoring:blood pressure monitoring and EKG    Spinal Block Procedure  Approach:midline  Guidance:palpation technique  Location:L3-L4  Needle Type:Collin  Needle Gauge:25 G  Placement of Spinal needle event:cerebrospinal fluid aspirated  Paresthesia: no  Fluid Appearance:clear  Medications: bupivacaine in dextrose (MARCAINE SPINAL / Hyberbaric) 0.75-8.25 % injection - Intrathecal   1.4 mL - 2/9/2024 3:42:00 AM   Post Assessment  Patient Tolerance:patient tolerated the procedure well with no apparent complications  Complications no

## 2024-02-09 NOTE — OP NOTE
Op Note      Indira Woodson  [id number]   2000     Preoperative diagnosis  1) Intrauterine pregnancy 32 5/7 weeks  2)  labor  3) non-reassuring fetal tracing  4) abnormal fetal dopplers  5) gest DM (diet controlled)  Operative diagnosis  1) Same    Procedure  1) primary  (LTUI)    Surgeon  1) Chato Fitch M.D.    Indications  Pt began having late-type fetal decelerations early evening.  In spite of IV fluid bolus and lateral positioning, pt continued to intermittently have the decelerations.  Pt also was  continuing with painful uterine contractions.  After discussion with Maternal-Fetal  Medicine, the decision was made to proceed to delivery.    Intraoperative findings  1) male infant, weight and apgars pending    Operative procedure  The patient was taken back to the operative suite and placed in the dorsal supine position after spinal anesthesia was placed.  Pt was then sterilely prepped and draped and indwelling Harrison catheter placed.  A Pfannenstiel-like skin incision was made and carried to the rectus fascia.  The fascia was incised and extended laterally in both directions.  The fascia was then undermined superiorly and inferiorly.  The rectus muscles were  in the midline exposing the peritoneum, which was entered sharply in a clear area.  The peritoneal incision was extended taking care not to enter the bladder.  The uterus was then nicked in the midline and low-transverse uterine incision was completed using blunt dissection.  The infant was then delivered onto the operative field and bulb suctioned.  Cord clamping was delayed 30-45 seconds.  The cord was then clamped and cut and passed off to the delivery team.  A segment of cord was obtained for cord blood ABG's.  Cord blood was obtained and the placenta was manually extracted.  The uterus was then externalized and cleaned with wet laps until clean.  The uterine incision was then closed in one layer using number 0 Monocryl.   Once good hemostasis was achieved, the posterior cul-de-sac was irrigated with saline and suctioned free of clots and debris.  The uterine incision and bladder flap were also irrigated.  Once good hemostasis was confirmed, the uterus was placed back into the peritoneal cavity.  Both gutters were then irrigated with saline and suctioned free of clots and debris.  One final inspection was made of the uterine incision and bladder flap area showing good hemostasis.  Next, the peritoneum was closed with 2-0 Vicryl.  The subfascial area was then inspected and when hemostatic, the fascia was closed with zero PDS in a running, non-locking stitch.  Next, the subcutaneous tissue was irrigated and when hemostatic the subcutaneous fat was reapproximated with 3-0 plain gut.  Finally the skin was closed with 2-0 Prolene in a running subcuticular stitch.  Skin glue was placed as a sealant.  The vagina was cleared of blood and clots and the patient was then taken to the recovery room in good condition.    EBL: 500ml    Specimens: cord blood, cord blood ABG's, placenta    Drains: Harrison to straight drain    Complications: none        Chato Fitch M.D

## 2024-02-09 NOTE — H&P (VIEW-ONLY)
Indira Woodson  [id number]  2000      Pt with new onset smooth decels c/w late-type decels.  Reassuring between decels.  US yesterday showed elevated dopplers.  Reviewed strip with Dr. Edwards.  Have reviewed tracing with patient and .  Will place pt on side (pt has been in semi-fowlers) and continue cont EFM.  If baby continues with decelerations, will proceed to .  Pt and  seem to understand and are comfortable with management.  All questions have been answered.    Chato Fitch MD  2024  01:00 EST

## 2024-02-09 NOTE — ANESTHESIA PREPROCEDURE EVALUATION
Anesthesia Evaluation     Patient summary reviewed and Nursing notes reviewed   NPO Solid Status: Waived due to emergency  NPO Liquid Status: Waived due to emergency           Airway   Mallampati: III  TM distance: <3 FB  Neck ROM: full  Difficult intubation highly probable and Small opening  Dental - normal exam     Pulmonary - negative pulmonary ROS   Cardiovascular - negative cardio ROS        Neuro/Psych- negative ROS  GI/Hepatic/Renal/Endo    (+) diabetes mellitus gestational well controlled    Musculoskeletal (-) negative ROS    Abdominal    Substance History - negative use     OB/GYN    (+) Pregnant        Other - negative ROS                   Anesthesia Plan    ASA 2 - emergent     spinal and ITN       Anesthetic plan, risks, benefits, and alternatives have been provided, discussed and informed consent has been obtained with: patient.    CODE STATUS:    Level Of Support Discussed With: Patient  Code Status (Patient has no pulse and is not breathing): CPR (Attempt to Resuscitate)  Medical Interventions (Patient has pulse or is breathing): Full Support

## 2024-02-09 NOTE — PAYOR COMM NOTE
"Douglas Hale (23 y.o. Female)     [employer] Auth#[id number]     Delivery information.    From:Ronna Buckley LPN, Utilization Review  Phone #[phone]  Fax #[phone]        Date of Birth   2000    Social Security Number       Address   [address] [address]    Home Phone   [phone]    MRN   [id number]       M[employer]on   None    Marital Status                               Admission Date   24    Admission Type   Elective    Admitting Provider   Titi Canales DO    Attending Provider   Titi Canales DO    Department, Room/Bed   [facility]TEPART[facility], N324/       Discharge Date       Discharge Disposition       Discharge Destination                                 Attending Provider: Titi Canales DO    Allergies: No Known Allergies    Isolation: None   Infection: None   Code Status: CPR    Ht: 165.1 cm (65\")   Wt: 59 kg (130 lb)    Admission Cmt: None   Principal Problem: None                  Active Insurance as of 2024       Primary Coverage       Payor Plan Insurance Group Employer/Plan Group    [place] [id number]       Payor Plan Address Payor Plan Phone Number Payor Plan Fax Number Effective Dates    [employer] [address] [address] [phone]  2021 - None Entered    [facility] [id number]         Subscriber Name Subscriber Birth Date Member ID       DOUGLAS HALE 2000 [id number]                     Emergency Contacts        (Rel.) Home Phone Work Phone Mobile Phone    padmaja Hale (Spouse) -- -- [phone]                 Operative/Procedure Notes (last 48 hours)        Chato Fitch MD at 24 0352          Op Note      Douglas Hale  [id number]   2000     Preoperative diagnosis  1) Intrauterine pregnancy 32 5/7 weeks  2)  labor  3) non-reassuring fetal tracing  4) abnormal fetal dopplers  5) gest DM (diet controlled)  Operative " diagnosis  1) Same    Procedure  1) primary  (LTUI)    Surgeon  1) Chato Fitch M.D.    Indications  Pt began having late-type fetal decelerations early evening.  In spite of IV fluid bolus and lateral positioning, pt continued to intermittently have the decelerations.  Pt also was  continuing with painful uterine contractions.  After discussion with Maternal-Fetal  Medicine, the decision was made to proceed to delivery.    Intraoperative findings  1) male infant, weight and apgars pending    Operative procedure  The patient was taken back to the operative suite and placed in the dorsal supine position after spinal anesthesia was placed.  Pt was then sterilely prepped and draped and indwelling Harrison catheter placed.  A Pfannenstiel-like skin incision was made and carried to the rectus fascia.  The fascia was incised and extended laterally in both directions.  The fascia was then undermined superiorly and inferiorly.  The rectus muscles were  in the midline exposing the peritoneum, which was entered sharply in a clear area.  The peritoneal incision was extended taking care not to enter the bladder.  The uterus was then nicked in the midline and low-transverse uterine incision was completed using blunt dissection.  The infant was then delivered onto the operative field and bulb suctioned.  Cord clamping was delayed 30-45 seconds.  The cord was then clamped and cut and passed off to the delivery team.  A segment of cord was obtained for cord blood ABG's.  Cord blood was obtained and the placenta was manually extracted.  The uterus was then externalized and cleaned with wet laps until clean.  The uterine incision was then closed in one layer using number 0 Monocryl.  Once good hemostasis was achieved, the posterior cul-de-sac was irrigated with saline and suctioned free of clots and debris.  The uterine incision and bladder flap were also irrigated.  Once good hemostasis was confirmed, the uterus was  placed back into the peritoneal cavity.  Both gutters were then irrigated with saline and suctioned free of clots and debris.  One final inspection was made of the uterine incision and bladder flap area showing good hemostasis.  Next, the peritoneum was closed with 2-0 Vicryl.  The subfascial area was then inspected and when hemostatic, the fascia was closed with zero PDS in a running, non-locking stitch.  Next, the subcutaneous tissue was irrigated and when hemostatic the subcutaneous fat was reapproximated with 3-0 plain gut.  Finally the skin was closed with 2-0 Prolene in a running subcuticular stitch.  Skin glue was placed as a sealant.  The vagina was cleared of blood and clots and the patient was then taken to the recovery room in good condition.    EBL: 500ml    Specimens: cord blood, cord blood ABG's, placenta    Drains: Harrison to straight drain    Complications: none        Chato Fitch M.D     Electronically signed by Chato Fitch MD at 24 0435          Physician Progress Notes (last 48 hours)        Chato Fitch MD at 24 0059            Indira Woodson  [id number]  2000      Pt with new onset smooth decels c/w late-type decels.  Reassuring between decels.  US yesterday showed elevated dopplers.  Reviewed strip with Dr. Edwards.  Have reviewed tracing with patient and .  Will place pt on side (pt has been in semi-fowlers) and continue cont EFM.  If baby continues with decelerations, will proceed to .  Pt and  seem to understand and are comfortable with management.  All questions have been answered.    Chato Fitch MD  2024  01:00 EST       Electronically signed by Chato Fitch MD at 24 0105       Chato Fitch MD at 24 0718            Indira Woodson  [id number]  2000    Referring physician: Verónica Kim M.D.    Chief complaint:  labor    S/ c/o occas contractions, some spotting.  Good FM.  No leaking.  Pt denies  HA, CP,   SOB  O/ 108/57, 17, 87, 98.3, 98%(RA)   Lungs: CTA   Heart: RRR, no m,g,r   Abd: +BS, soft, non-tend   Ext: no calf tend   FHT's: indication:  labor, onset 2135, offset 2234, baselin 125, mod    BTB variability, mult accels (15 X 15), no decels, rare contractions,    Interpretation: reactive NST   Labs: BS's     A/1)IUP 32 4/7 weeks-  steroid therapeutic this afternoon     2) labor     3)gest DM- diet controlled, BS's normal  P/1)d/c magnesium when steroids are therapeutic     2)expectant management after magnesium d/c'ed    Chato Fitch MD  2024  07:18 EST       Electronically signed by Chato Fitch MD at 24 0732

## 2024-02-09 NOTE — INTERVAL H&P NOTE
H&P updated. The patient was examined and last cervix exam at ~2100 showed cervix 1/70%/-2.  Pt's baby has had 2 more smooth decelerations and pt complaining of worsening contractions.  Will proceed to delivery for non-reassuring tracing.  A/1)IUP 32 5/7 weeks     2)non-reassuring tracing     3)abn fetal dopplers     4)threatened  labor  P/1)

## 2024-02-09 NOTE — LACTATION NOTE
02/09/24 1152   Maternal Information   Date of Referral 02/09/24   Person Making Referral lactation consultant   Maternal Reason for Referral no prior breastfeeding experience   Infant Reason for Referral NICU admission   Maternal Assessment   Breast Size Issue none   Breast Shape Bilateral:;round   Breast Density Bilateral:;soft   Nipples Bilateral:;everted   Left Nipple Symptoms intact;nontender   Right Nipple Symptoms intact;nontender   Maternal Infant Feeding   Maternal Emotional State receptive;relaxed   Support Person Involvement other (see comments)  (FOB in NICU visiting infant)   Milk Expression/Equipment   Breast Pump Type double electric, hospital grade   Breast Pump Flange Type hard   Breast Pump Flange Size 21 mm   Equipment for Home Use breast pump provided;other (see comments)  (provided and set up hospital pump and has personal Mom [employer] pump; mother stated she is also on her father's insurance and wonders if she can get another pump from our [employer] supply; she will call [employer] and find out and let lactation know)   Breast Pumping   Breast Pumping Interventions early pumping promoted;frequent pumping encouraged  (encouraged to pump every three hours)   Breast Pumping double electric breast pump utilized     Courtesy visit with mother; assisted mother in pumping for infant in NICU; mother desires to nurse, pump and supplement with formula; provided all instruction for pumping, cleaning supplies, and gathering any EBM expressed; mother is utilizing a 21mm pump flange for pumping; went over education; mother pumped 0.8ml of EBM and LC took to NICU to place in infant fridge; provided FOB with instruction on how to label breast milk labels; requested patient RN to print those out for dad; mother has a personal Mom Cozy pump and stated that she is also on her father's insurance and that maybe she can get another pump from our supply; encouraged mother to call insurance company and find out and let  lactation know; encouraged to pump every three hours for optimal stimulation and milk production; to call lactation PRN or had questions/concerns.

## 2024-02-09 NOTE — PROGRESS NOTES
Indira Woodson  [id number]  2000      Pt with new onset smooth decels c/w late-type decels.  Reassuring between decels.  US yesterday showed elevated dopplers.  Reviewed strip with Dr. Edwards.  Have reviewed tracing with patient and .  Will place pt on side (pt has been in semi-fowlers) and continue cont EFM.  If baby continues with decelerations, will proceed to .  Pt and  seem to understand and are comfortable with management.  All questions have been answered.    Chato Fitch MD  2024  01:00 EST

## 2024-02-09 NOTE — ANESTHESIA POSTPROCEDURE EVALUATION
Patient: Indira Woodson    Procedure Summary       Date: 24 Room / Location: [facility] LABOR DELIVERY   [facility] LABOR DELIVERY    Anesthesia Start: 334 Anesthesia Stop:     Procedure:  SECTION PRIMARY (Abdomen) Diagnosis:     Surgeons: Chato Fitch MD Provider: Vanesa Sorenson DO    Anesthesia Type: spinal, ITN ASA Status: 2 - Emergent            Anesthesia Type: spinal, ITN    Vitals  Vitals Value Taken Time   /61 24 0450   Temp 98.9 °F (37.2 °C) 24 0431   Pulse 78 24 0458   Resp 14 24 0438   SpO2 94 % 24 0457   Vitals shown include unfiled device data.        Post Anesthesia Care and Evaluation    Patient location during evaluation: bedside  Patient participation: complete - patient participated  Level of consciousness: awake and awake and alert  Pain score: 0  Pain management: satisfactory to patient    Airway patency: patent  Anesthetic complications: No anesthetic complications  PONV Status: none  Cardiovascular status: acceptable, hemodynamically stable and stable  Respiratory status: acceptable  Hydration status: stable  Post Neuraxial Block status: No signs or symptoms of PDPH    Oxy SAT upon arrival to PACU 80%.  Again, no distress.Pt is awake and alert, yet a bit sleepy.  4liters NC applied.  Now 95%.  Pt sitting up.  Will order CXR.

## 2024-02-10 LAB
BASOPHILS # BLD AUTO: 0.02 10*3/MM3 (ref 0–0.2)
BASOPHILS NFR BLD AUTO: 0.2 % (ref 0–1.5)
DEPRECATED RDW RBC AUTO: 43 FL (ref 37–54)
EOSINOPHIL # BLD AUTO: 0.07 10*3/MM3 (ref 0–0.4)
EOSINOPHIL NFR BLD AUTO: 0.7 % (ref 0.3–6.2)
ERYTHROCYTE [DISTWIDTH] IN BLOOD BY AUTOMATED COUNT: 13.4 % (ref 12.3–15.4)
HCT VFR BLD AUTO: 34 % (ref 34–46.6)
HGB BLD-MCNC: 10.7 G/DL (ref 12–15.9)
IMM GRANULOCYTES # BLD AUTO: 0.05 10*3/MM3 (ref 0–0.05)
IMM GRANULOCYTES NFR BLD AUTO: 0.5 % (ref 0–0.5)
LYMPHOCYTES # BLD AUTO: 1.52 10*3/MM3 (ref 0.7–3.1)
LYMPHOCYTES NFR BLD AUTO: 14.3 % (ref 19.6–45.3)
MCH RBC QN AUTO: 27.5 PG (ref 26.6–33)
MCHC RBC AUTO-ENTMCNC: 31.5 G/DL (ref 31.5–35.7)
MCV RBC AUTO: 87.4 FL (ref 79–97)
MONOCYTES # BLD AUTO: 1.04 10*3/MM3 (ref 0.1–0.9)
MONOCYTES NFR BLD AUTO: 9.8 % (ref 5–12)
NEUTROPHILS NFR BLD AUTO: 7.96 10*3/MM3 (ref 1.7–7)
NEUTROPHILS NFR BLD AUTO: 74.5 % (ref 42.7–76)
NRBC BLD AUTO-RTO: 0 /100 WBC (ref 0–0.2)
PLATELET # BLD AUTO: 187 10*3/MM3 (ref 140–450)
PMV BLD AUTO: 10.5 FL (ref 6–12)
RBC # BLD AUTO: 3.89 10*6/MM3 (ref 3.77–5.28)
WBC NRBC COR # BLD AUTO: 10.66 10*3/MM3 (ref 3.4–10.8)

## 2024-02-10 PROCEDURE — 25010000002 KETOROLAC TROMETHAMINE PER 15 MG: Performed by: OBSTETRICS & GYNECOLOGY

## 2024-02-10 PROCEDURE — 99231 SBSQ HOSP IP/OBS SF/LOW 25: CPT | Performed by: OBSTETRICS & GYNECOLOGY

## 2024-02-10 PROCEDURE — 85025 COMPLETE CBC W/AUTO DIFF WBC: CPT | Performed by: OBSTETRICS & GYNECOLOGY

## 2024-02-10 RX ADMIN — OXYCODONE HYDROCHLORIDE 10 MG: 10 TABLET ORAL at 22:45

## 2024-02-10 RX ADMIN — OXYCODONE HYDROCHLORIDE 10 MG: 10 TABLET ORAL at 14:38

## 2024-02-10 RX ADMIN — KETOROLAC TROMETHAMINE 15 MG: 15 INJECTION, SOLUTION INTRAMUSCULAR; INTRAVENOUS at 09:40

## 2024-02-10 RX ADMIN — OXYCODONE HYDROCHLORIDE 5 MG: 5 TABLET ORAL at 09:45

## 2024-02-10 RX ADMIN — ACETAMINOPHEN 1000 MG: 500 TABLET ORAL at 04:14

## 2024-02-10 RX ADMIN — ACETAMINOPHEN 650 MG: 325 TABLET ORAL at 22:44

## 2024-02-10 RX ADMIN — PRENATAL VITAMINS-IRON FUMARATE 27 MG IRON-FOLIC ACID 0.8 MG TABLET 1 TABLET: at 09:40

## 2024-02-10 RX ADMIN — KETOROLAC TROMETHAMINE 15 MG: 15 INJECTION, SOLUTION INTRAMUSCULAR; INTRAVENOUS at 01:45

## 2024-02-10 RX ADMIN — IBUPROFEN 600 MG: 600 TABLET, FILM COATED ORAL at 19:46

## 2024-02-10 RX ADMIN — IBUPROFEN 600 MG: 600 TABLET, FILM COATED ORAL at 14:37

## 2024-02-10 RX ADMIN — ACETAMINOPHEN 650 MG: 325 TABLET ORAL at 17:01

## 2024-02-10 NOTE — PLAN OF CARE
Goal Outcome Evaluation:  Plan of Care Reviewed With: patient        Progress: improving  Outcome Evaluation: VSS, Small lochia.  Incision well approx.  Redness noted  inferior to incision.  Pain well controlled with PO meds.  Pt. was transferred from APU this afternoon.

## 2024-02-10 NOTE — PROGRESS NOTES
[facility] Alyson Woodson  : 2000  MRN: [id number]  CSN: [id number]  Referring physician: Verónica Kim M.D.       Hospital Day: 5    Post-operative Day #1  Subjective       CC: hospital follow-up    Her pain is well controlled. Vaginal bleeding is normal in amount. She is ambulating without difficulty. She is passing gas. She is voiding without difficulty.  She denies fever or chills.        Objective     Min/max vitals past 24 hours:   Temp  Min: 97.6 °F (36.4 °C)  Max: 98.9 °F (37.2 °C)  BP  Min: 103/57  Max: 123/69  Pulse  Min: 64  Max: 107  Resp  Min: 15  Max: 18        General: well developed; well nourished  no acute distress   Abdomen: soft, non-tender; Firm fundus below umbilicus   incision is clean, dry, and intact. There is some erythema below the incision, but likely irritation  Will watch for worsening and cellulitis   Normal findings: bowel sounds normal   Pelvic: Not performed   Ext: Calves NT     Last 3 values   Results from last 7 days   Lab Units 02/10/24  0424 24  1856   WBC 10*3/mm3 10.66 16.50*   HEMOGLOBIN g/dL 10.7* 12.7   HEMATOCRIT % 34.0 38.0   PLATELETS 10*3/mm3 187 212     Lab Results   Component Value Date    RH Positive 2024    HEPBSAG Negative 08/10/2023     Results from last 7 days   Lab Units 24  1856   TREPONEMA PALLIDUM AB TOTAL  Non-Reactive                Assessment   POD #1 S/P Primary LTCS  GDM     Plan   Will transfer to [facility]  Watch reddened area below incision.  Continue routine post operative care    Marcelo Beltran MD  2/10/2024  06:50 EST     Called to examine incision prior to transfer to MBU. Area of ecchymoses inferior to incision, appropriately tender to incision. Incision otherwise healing well. Will make note of exam findings and re-evaluate as needed. Ok to transfer to M[facility].    Ludivina Estrada MD

## 2024-02-10 NOTE — ANESTHESIA POSTPROCEDURE EVALUATION
Patient: Indira Woodson    Procedure Summary       Date: 24 Room / Location: [facility] LABOR DELIVERY   [facility] LABOR DELIVERY    Anesthesia Start: 334 Anesthesia Stop: 502    Procedure:  SECTION PRIMARY (Abdomen) Diagnosis:     Surgeons: Chato Fitch MD Provider: Vanesa Sorenson DO    Anesthesia Type: spinal, ITN ASA Status: 2 - Emergent            Anesthesia Type: spinal, ITN    Vitals  Vitals Value Taken Time   /75 02/10/24 0737   Temp 98.5 °F (36.9 °C) 02/10/24 0737   Pulse 86 02/10/24 0800   Resp 16 02/10/24 0737   SpO2 98 % 02/10/24 0800   Vitals shown include unfiled device data.        Post Anesthesia Care and Evaluation    Patient location during evaluation: bedside  Patient participation: complete - patient participated  Level of consciousness: awake and awake and alert  Pain score: 0  Pain management: satisfactory to patient    Airway patency: patent  Anesthetic complications: No anesthetic complications  PONV Status: none  Cardiovascular status: acceptable, hemodynamically stable and stable  Respiratory status: acceptable  Hydration status: stable  Post Neuraxial Block status: Motor and sensory function returned to baseline and No signs or symptoms of PDPH

## 2024-02-10 NOTE — LACTATION NOTE
02/10/24 1620   Maternal Information   Date of Referral 02/10/24   Person Making Referral lactation consultant  (follow up prior to discharge; pt reports pumping is going well; has ordered momcozy for home use; discussed pump for preemie)   Maternal Infant Feeding   Maternal Emotional State independent;relaxed;receptive   Breast Pumping   Breast Pumping Interventions frequent pumping encouraged     PRN LC contact/outpatient clinic encouraged

## 2024-02-11 PROCEDURE — 99231 SBSQ HOSP IP/OBS SF/LOW 25: CPT | Performed by: OBSTETRICS & GYNECOLOGY

## 2024-02-11 RX ORDER — IBUPROFEN 600 MG/1
600 TABLET ORAL EVERY 6 HOURS
Status: DISCONTINUED | OUTPATIENT
Start: 2024-02-11 | End: 2024-02-12 | Stop reason: HOSPADM

## 2024-02-11 RX ORDER — ACETAMINOPHEN 325 MG/1
650 TABLET ORAL EVERY 6 HOURS
Status: DISCONTINUED | OUTPATIENT
Start: 2024-02-11 | End: 2024-02-12 | Stop reason: HOSPADM

## 2024-02-11 RX ADMIN — IBUPROFEN 600 MG: 600 TABLET, FILM COATED ORAL at 09:46

## 2024-02-11 RX ADMIN — DOCUSATE SODIUM 100 MG: 100 CAPSULE, LIQUID FILLED ORAL at 03:57

## 2024-02-11 RX ADMIN — IBUPROFEN 600 MG: 600 TABLET, FILM COATED ORAL at 23:38

## 2024-02-11 RX ADMIN — DOCUSATE SODIUM 100 MG: 100 CAPSULE, LIQUID FILLED ORAL at 23:39

## 2024-02-11 RX ADMIN — Medication 1 APPLICATION: at 06:14

## 2024-02-11 RX ADMIN — IBUPROFEN 600 MG: 600 TABLET, FILM COATED ORAL at 15:45

## 2024-02-11 RX ADMIN — ACETAMINOPHEN 650 MG: 325 TABLET ORAL at 13:13

## 2024-02-11 RX ADMIN — IBUPROFEN 600 MG: 600 TABLET, FILM COATED ORAL at 03:55

## 2024-02-11 RX ADMIN — ACETAMINOPHEN 650 MG: 325 TABLET ORAL at 06:12

## 2024-02-11 RX ADMIN — ACETAMINOPHEN 650 MG: 325 TABLET ORAL at 18:40

## 2024-02-11 RX ADMIN — SIMETHICONE 80 MG: 80 TABLET, CHEWABLE ORAL at 23:39

## 2024-02-11 RX ADMIN — PRENATAL VITAMINS-IRON FUMARATE 27 MG IRON-FOLIC ACID 0.8 MG TABLET 1 TABLET: at 09:46

## 2024-02-11 RX ADMIN — SIMETHICONE 80 MG: 80 TABLET, CHEWABLE ORAL at 09:46

## 2024-02-11 RX ADMIN — OXYCODONE HYDROCHLORIDE 5 MG: 5 TABLET ORAL at 23:43

## 2024-02-11 NOTE — H&P
[facility] Aylson Woodson  : 2000  MRN: [id number]  CSN: [id number]    Referring physician: Verónica Kim M.D.      Hospital Day: 6    Post-operative Day #2  Subjective     CC: hospital follow-up    Her pain is well controlled. Vaginal bleeding is normal in amount. She is ambulating without difficulty. She is passing gas. She is voiding without difficulty. Her baby is doing well.     Objective     Min/max vitals past 24 hours:   Temp  Min: 97.8 °F (36.6 °C)  Max: 98.6 °F (37 °C)  BP  Min: 104/54  Max: 129/79  Pulse  Min: 76  Max: 95  Resp  Min: 16  Max: 16        General: well developed; well nourished  no acute distress   Abdomen: soft, non-tender; no masses  incision is clean, dry, intact, and without drainage  fundus firm and non-tender   Pelvic: Not performed   Ext: Calves NT     Results from last 7 days   Lab Units 02/10/24  0424 24  1856   WBC 10*3/mm3 10.66 16.50*   HEMOGLOBIN g/dL 10.7* 12.7   HEMATOCRIT % 34.0 38.0   PLATELETS 10*3/mm3 187 212     Lab Results   Component Value Date    RH Positive 2024    HEPBSAG Negative 08/10/2023     Results from last 7 days   Lab Units 24  1856   TREPONEMA PALLIDUM AB TOTAL  Non-Reactive                Assessment   POD #2 S/P Primary  (LTCS)  Doing well     Plan   Continue routine post-operative care  Likely discharge tomorrow    Marcelo Beltran MD  2024  08:00 EST

## 2024-02-11 NOTE — CASE MANAGEMENT/SOCIAL WORK
Continued Stay Note  [facility]     Patient Name: Indira Woodson  MRN: [id number]  Today's Date: 2/11/2024    Admit Date: 2/6/2024    Plan: MSW available   Discharge Plan       Row Name 02/11/24 1412       Plan    Plan MSW available    Plan Comments MSW received consult due to PPD score. MSW met with Pt and spouse at bedside and provided PPD resource packet. Pt reports this is first child. Pt’s baby is currently in NICU; MSW also provided NICU resource packet. Pt reports no needs or concerns. MSW available.    Final Discharge Disposition Code 01 - home or self-care                   Discharge Codes    No documentation.                       ANA Carr

## 2024-02-12 VITALS
BODY MASS INDEX: 21.66 KG/M2 | HEART RATE: 95 BPM | HEIGHT: 65 IN | TEMPERATURE: 98.3 F | WEIGHT: 130 LBS | DIASTOLIC BLOOD PRESSURE: 76 MMHG | RESPIRATION RATE: 16 BRPM | SYSTOLIC BLOOD PRESSURE: 130 MMHG | OXYGEN SATURATION: 97 %

## 2024-02-12 LAB
CYTO UR: NORMAL
LAB AP CASE REPORT: NORMAL
LAB AP CLINICAL INFORMATION: NORMAL
PATH REPORT.FINAL DX SPEC: NORMAL
PATH REPORT.GROSS SPEC: NORMAL

## 2024-02-12 PROCEDURE — 99239 HOSP IP/OBS DSCHRG MGMT >30: CPT | Performed by: OBSTETRICS & GYNECOLOGY

## 2024-02-12 RX ORDER — IBUPROFEN 600 MG/1
600 TABLET ORAL EVERY 6 HOURS
Qty: 30 TABLET | Refills: 0 | Status: SHIPPED | OUTPATIENT
Start: 2024-02-12

## 2024-02-12 RX ADMIN — IBUPROFEN 600 MG: 600 TABLET, FILM COATED ORAL at 07:01

## 2024-02-12 RX ADMIN — PRENATAL VITAMINS-IRON FUMARATE 27 MG IRON-FOLIC ACID 0.8 MG TABLET 1 TABLET: at 08:21

## 2024-02-12 RX ADMIN — ACETAMINOPHEN 650 MG: 325 TABLET ORAL at 08:20

## 2024-02-12 RX ADMIN — ACETAMINOPHEN 650 MG: 325 TABLET ORAL at 03:01

## 2024-02-12 RX ADMIN — IBUPROFEN 600 MG: 600 TABLET, FILM COATED ORAL at 11:21

## 2024-02-12 NOTE — CASE MANAGEMENT/SOCIAL WORK
Continued Stay Note  [facility]     Patient Name: Indira Woodson  MRN: [id number]  Today's Date: 2/12/2024    Admit Date: 2/6/2024    Plan: MSW available   Discharge Plan       Row Name 02/12/24 1455       Plan    Plan MSW available    Plan Comments Spoke with RN who states FOB is expressing feelings of depression. Visited pt and FOB. FOB states he is struggling. Discussed PPD and informed FOB that fathers can be diagnosed with PPD and other PP diagnosis. FOB states he has ADHD and is struggling with the idle times during the hospital stay. Pt should be discharged today. FOB is hopeful d/cing home with help the alleviate some of the symptoms. Provided printed info on PPD  and info on mental health resources.    Final Discharge Disposition Code 01 - home or self-care                   Discharge Codes    No documentation.                 Expected Discharge Date and Time       Expected Discharge Date Expected Discharge Time    Feb 12, 2024               ANA Linton

## 2024-02-12 NOTE — LACTATION NOTE
"   02/12/24 1145   Maternal Information   Date of Referral 02/12/24   Person Making Referral nurse;patient   Maternal Reason for Referral   (Pt having questions regarding Pump for Preemie. Referred above question to NICU as this is their program. Gave pt a Spectra pump from [employer]. QR instructional code also given.)   Maternal Infant Feeding   Maternal Emotional State   (Notified PCNJADEN RN regarding comment made by RONDA. She will refer to .)   Support Person Involvement   (FOB stated he's feeling depressed.I asked if he has a good support system at home & he said \"yes\". 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 Encouraged pt to pump q3hrs.)       "

## 2024-02-12 NOTE — DISCHARGE SUMMARY
Admission date: 2024  Discharge date: 24    Referring Provider: Verónica Kim MD    Admission diagnosis:   labor [O60.00]      * No active hospital problems. *       Discharge diagnosis:  23-year-old -1-0-1 postop day #3 after a primary  section due to  labor and nonreassuring fetal heart tracing.  2.  Mild postpartum anemia  3.   labor and delivery  Procedures: Primary low-transverse  section    Consultants: Maternal-fetal medicine, anesthesia, and .      Hospital course:      Patient 23-year-old female G1, P0 at 32 weeks 2 days, who presents as a transfer from  for [place] for  labor.  Initial prenatal care by Dr. Kim  in [facility].  Patient presented to labor and delivery in [place] after experiencing abdominal pain and vaginal spotting.  After arrival to labor and delivery patient noted to have regular uterine activity.  She was initially closed despite IV hydration and terbutaline progressed to 1 cm.  Due to early gestation she was transferred on magnesium sulfate, given her first dose of betamethasone.   course complicated by gestational diabetes mellitus A1 well-controlled.  After initial evaluation, reassuring fetal status and continued contractions however cervix remained 1 cm.  PDC ultrasound revealed normal growth over did have mild elevation of umbilical artery Dopplers.  She remained stable till hospital day 4 when intermittent  significant concerning fetal decelerations countered.  After discussion with MFM, the decision was made to proceed with delivery.  Patient underwent a primary low transverse  section without incident on  at 4 AM.  Viable male  vertex presentation Apgar scores of 5 and 8, and weight 2240 g.  Her diet and activity were advanced.  Patient noted to have slight fluid overload therefore was given several dose of Lasix with rapid resolution.  We had no further  concerns of peripartum cardiomyopathy.  Had a rapid return of bowel function.  Postpartum H&H stable at 10.7 34.0.  By postop day 3, it was believed she reached her [facility] benefit and discharged home.  Infant remains in NICU in a stable condition.  Patient discharged home with routine postop instructions.  Sutures moved prior to discharge.  Rx Motrin 600 mg every 6 hours as needed and to continue home medications.  Follow-up in [place] in 1 to 2 weeks for postop checkup and continued postpartum and gynecological care.  Maternal blood type O+.    Vitals:    02/12/24 0703   BP: 130/76   Pulse: 95   Resp: 16   Temp: 98.3 °F (36.8 °C)   SpO2:        GENERAL:  Well-developed, well-nourished in no acute distress.   ABD:  Gravid uterus purply tender firm below the umbilicus incision intact without signs erythema or infection small mount of bruising noted on the inferior portion of incision.    EXTREMITIES:  No clubbing, cyanosis or edema.  PSYCHIATRIC:  Normal affect and mood.      Discharge condition: stable  Discharge diet   Dietary Orders (From admission, onward)       Start     Ordered    02/09/24 1100  Diet: Regular/House Diet; Texture: Regular Texture (IDDSI 7); Fluid Consistency: Thin (IDDSI 0)  Diet Effective Now        References:    Diet Order [place]   Question Answer Comment   Diets: Regular/House Diet    Texture: Regular Texture (IDDSI 7)    Fluid Consistency: Thin (IDDSI 0)        02/09/24 0924                  Additional Instructions: Call with fevers, uncontrolled nausea/vomiting/pain.  Medications:      Discharge Medications        New Medications        Instructions Start Date   ibuprofen 600 MG tablet  Commonly known as: ADVIL,MOTRIN   600 mg, Oral, Every 6 Hours             Continue These Medications        Instructions Start Date   Prenatal 27-1 27-1 MG tablet tablet   Oral, Daily             Disposition: home  Follow up: one - two weeks in [facility] for postop follow-up.    I spent  over 30 minutes on discharge activity which included: face-to-face encounter counseling with the patient, reviewing the data in the system, coordination of the care with the nursing staff as well as consultants, documentation, and entering orders.      Titi Canales, DO  12:47 EST

## 2024-02-14 NOTE — L&D DELIVERY NOTE
[facility]   Vaginal Delivery Note    Patient Name: Indira Woodsno  : 2000  MRN: [id number]    Date of Delivery: 2024     See formal operative note    see    Chato Fitch MD  24  03:42 EST

## 2024-02-19 ENCOUNTER — MATERNAL SCREENING (OUTPATIENT)
Dept: CALL CENTER | Facility: HOSPITAL | Age: 24
End: 2024-02-19
Payer: MEDICAID

## 2024-02-19 NOTE — OUTREACH NOTE
Maternal Screening Survey      Flowsheet Row Responses   Facility patient discharged from? [place]   Attempt successful? No   Unsuccessful attempts Attempt 1              Harika FLORES - Registered Nurse

## 2024-02-19 NOTE — OUTREACH NOTE
Maternal Screening Survey      Flowsheet Row Responses   Facility patient discharged from? [place]   Attempt successful? No   Unsuccessful attempts Attempt 2              Harika FLORES - Registered Nurse

## 2024-02-20 ENCOUNTER — MATERNAL SCREENING (OUTPATIENT)
Dept: CALL CENTER | Facility: HOSPITAL | Age: 24
End: 2024-02-20
Payer: MEDICAID

## 2024-02-20 NOTE — OUTREACH NOTE
Maternal Screening Survey      Flowsheet Row Responses   Facility patient discharged from? [place]   Attempt successful? Yes   Call start time 1256   Call end time 1304   Person spoke with today (if not patient) and relationship patient   EPD Scale: Able to Laugh 0-->as much as she always could   EPD Scale: Looked Forward 0-->as much as she ever did   EPD Scale: Blamed Self 2-->yes, some of the time   EPD Scale: Been Anxious 2-->yes, sometimes   EPD Scale: Felt Panicky 2-->yes, sometimes   EPD Scale: Things Getting on Top 1-->no, most of the time has coped quite well   EPD Scale: Difficulty Sleeping 0-->no, not at all   EPD Scale: Sad or Miserable 0-->no, not at all   EPD Scale: Crying 1-->only occasionally   EPD Scale: Thought of Harming Self 0-->never   [place]  Depression Score 8   Did any of your parents have problems with alcohol or drug use? No   Do any of your peers have problems with alcohol or drug use? No   Does your partner have problems with alcohol or drug use? No   Before you were pregnant did you have problems with alcohol or drug use? (past) No   In the past month, did you drink beer, wine, liquor or use any other drugs? (pregnancy) No   Maternal Screening call completed Yes   Wrap up additional comments Patient is in [place] at present time. Mom reports she feels as though she would feel better and more relaxed after the baby is home.   Call end time 1304              Chip MORTON - Registered Nurse

## (undated) DEVICE — SUT PLAIN  3/0 CT1 27IN 842H

## (undated) DEVICE — MANIFLD WAST MGMT SYS NEPTUNE2 4PT

## (undated) DEVICE — TRAP FLD MINIVAC MEGADYNE 100ML

## (undated) DEVICE — BOWL UTIL STRL 32OZ

## (undated) DEVICE — SUT VIC 2/0 CT1 27IN J339H BX/36

## (undated) DEVICE — ADHS SKIN PREMIERPRO EXOFIN TOPICAL HI/VISC .5ML

## (undated) DEVICE — SUT PROLN 2/0 PC3 8833H

## (undated) DEVICE — APPL CHLORAPREP TINTED 26ML TEAL

## (undated) DEVICE — TRY SPINE BLCK WHITACRE 25G 3X5IN

## (undated) DEVICE — PK C/SECT 10

## (undated) DEVICE — CLTH CLENS READYCLEANSE PERI CARE PK/5

## (undated) DEVICE — SOL IRR NACL 0.9PCT BT 1000ML

## (undated) DEVICE — TBG PENCL TELESCP MEGADYNE SMOKE EVAC 10FT

## (undated) DEVICE — GLV SURG SENSICARE W/ALOE PF LF 7.5 STRL

## (undated) DEVICE — PAD GRND E/S MEGADYNE MONOPLR 2/PLT W/CORD A/ DISP

## (undated) DEVICE — SUT VIC 0/0 CT 27IN J352H BX/36

## (undated) DEVICE — MAT PREVALON MOBL TRANSFR AIR W/PAD REPROC 39X81IN

## (undated) DEVICE — SOL IRR H2O BTL 1000ML STRL